# Patient Record
Sex: FEMALE | Race: WHITE | Employment: UNEMPLOYED | URBAN - METROPOLITAN AREA
[De-identification: names, ages, dates, MRNs, and addresses within clinical notes are randomized per-mention and may not be internally consistent; named-entity substitution may affect disease eponyms.]

---

## 2019-02-11 ENCOUNTER — OFFICE VISIT (OUTPATIENT)
Dept: OBGYN CLINIC | Age: 49
End: 2019-02-11

## 2019-02-11 VITALS
SYSTOLIC BLOOD PRESSURE: 149 MMHG | RESPIRATION RATE: 19 BRPM | HEART RATE: 92 BPM | BODY MASS INDEX: 26.03 KG/M2 | WEIGHT: 162 LBS | DIASTOLIC BLOOD PRESSURE: 79 MMHG | HEIGHT: 66 IN

## 2019-02-11 DIAGNOSIS — Z12.31 VISIT FOR SCREENING MAMMOGRAM: ICD-10-CM

## 2019-02-11 DIAGNOSIS — Z11.51 SPECIAL SCREENING EXAMINATION FOR HUMAN PAPILLOMAVIRUS (HPV): ICD-10-CM

## 2019-02-11 DIAGNOSIS — Z01.419 WELL FEMALE EXAM WITH ROUTINE GYNECOLOGICAL EXAM: Primary | ICD-10-CM

## 2019-02-11 NOTE — PROGRESS NOTES
Dheeraj Sylvester is a [address],  57 y.o. female [facility] whose LMP was on 1/25/2019 who presents for her annual checkup. She is having no significant problems. Menstrual status:    Her periods are moderate in flow. She is using three to five pads or tampons per day, and irregular. Occur every 1 to 3 months. They can be heavy. She denies dysmenorrhea. She reports no premenstrual symptoms. The patient is not using HRT. Contraception:    The current method of family planning is none. Sexual history:    She  reports that she currently engages in sexual activity and has had partners who are Male. She reports using the following method of birth control/protection: Condom. Medical conditions:    Since her last annual GYN exam about three or more years ago, she has had the following changes in her health history: none. Pap and Mammogram History:    Her most recent Pap smear was normal obtained year(s) ago. The patient has not had a recent mammogram.    Breast Cancer History/Substance Abuse:    She has no family history of breast cancer. Osteoporosis History:    Family history does not include a first or second degree relative with osteopenia or osteoporosis. A bone density scan has not been previously obtained. Past Medical History:   Diagnosis Date    Abnormal Pap smear     askis in 2001    Heart abnormality     arrythmias with prev twin gestation; now resolved.  Psychiatric problem     anxiety and depression, no treatment for the last 5 years     Past Surgical History:   Procedure Laterality Date    ND ANESTH,DX ARTHROSCOPIC PROC KNEE JOINT      left knee 1992     Current Outpatient Medications   Medication Sig Dispense Refill    ibuprofen (MOTRIN) 600 mg tablet Take 1 Tab by mouth every six (6) hours as needed for Pain. 30 Tab 1    FERROUS FUMARATE (IRON PO) Take  by mouth.       AMBULATORY BREAST PUMP As directed 1 Device 0    PRENAPLUS 27-1 mg tab Allergies: Erythromycin; Codeine; Pcn [penicillins]; and Phenergan [promethazine]   Social History     Socioeconomic History    Marital status:      Spouse name: Marcelo Cunha Number of children: 7    Years of education: Not on file    Highest education level: Not on file   Social Needs    Financial resource strain: Not on file    Food insecurity - worry: Not on file    Food insecurity - inability: Not on file   [employer] needs - medical: Not on file   [employer] needs - non-medical: Not on file   Occupational History    Not on file   Tobacco Use    Smoking status: Current Every Day Smoker     Packs/day: 0.50     Years: 25.00     Pack years: 12.50    Smokeless tobacco: Never Used   Substance and Sexual Activity    Alcohol use: No    Drug use: No    Sexual activity: Yes     Partners: Male     Birth control/protection: Condom   Other Topics Concern    Not on file   Social History Narrative    Not on file     Tobacco History:  reports that she has been smoking. She has a 12.50 pack-year smoking history. she has never used smokeless tobacco.  Alcohol Abuse:  reports that she does not drink alcohol. Drug Abuse:  reports that she does not use drugs.   Patient Active Problem List   Diagnosis Code   (none) - all problems resolved or deleted         Review of Systems - History obtained from the patient  Constitutional: negative for weight loss, fever, night sweats  HEENT: negative for hearing loss, earache, congestion, snoring, sorethroat  CV: negative for chest pain, palpitations, edema  Resp: negative for cough, shortness of breath, wheezing  GI: negative for change in bowel habits, abdominal pain, black or bloody stools  : negative for frequency, dysuria, hematuria, vaginal discharge  MSK: negative for back pain, joint pain, muscle pain  Breast: negative for breast lumps, nipple discharge, galactorrhea  Skin :negative for itching, rash, hives  Neuro: negative for dizziness, headache, confusion, weakness  Psych: negative for anxiety, depression, change in mood  Heme/lymph: negative for bleeding, bruising, pallor    Physical Exam    Visit Vitals  /79 (BP 1 Location: Left arm, BP Patient Position: Sitting)   Pulse 92   Resp 19   Ht 5' 6\" (1.676 m)   Wt 162 lb (73.5 kg)   LMP 01/25/2019   BMI 26.15 kg/m²     Constitutional  · Appearance: well-nourished, well developed, alert, in no acute distress    HENT  · Head and Face: appears normal    Neck  · Inspection/Palpation: normal appearance, no masses or tenderness  Lymph Nodes: no lymphadenopathy present    Chest  · Respiratory Effort: breathing normal    Breasts  · Inspection of Breasts: breasts symmetrical, no skin changes, no discharge present, nipple appearance normal, no skin retraction present  · Palpation of Breasts and Axillae: no masses present on palpation, no breast tenderness  · Axillary Lymph Nodes: no lymphadenopathy present    Gastrointestinal  · Abdominal Examination: abdomen non-tender to palpation, normal bowel sounds, no masses present  · Liver and spleen: no hepatomegaly present, spleen not palpable  · Hernias: no hernias identified    Skin  · General Inspection: no rash, no lesions identified    Neurologic/Psychiatric  · Mental Status:  · Orientation: grossly oriented to person, place and time  · Mood and Affect: mood normal, affect appropriate    Genitourinary  · External Genitalia: normal appearance for age, no discharge present, no tenderness present, no inflammatory lesions present, no masses present, no atrophy present  · Vagina: normal vaginal vault without central or paravaginal defects, no discharge present, no inflammatory lesions present, no masses present  · Bladder: non-tender to palpation  · Urethra: appears normal  · Cervix: normal   · Uterus: normal size, shape and consistency  · Adnexa: no adnexal tenderness present, no adnexal masses present  · Perineum: perineum within normal limits, no evidence of trauma, no rashes or skin lesions present  · Anus: anus within normal limits, no hemorrhoids present  · Inguinal Lymph Nodes: no lymphadenopathy present    Assessment:  Routine gynecologic examination  Her current medical status is satisfactory with no evidence of significant gynecologic issues. Plan:  Counseled re: diet, exercise, healthy lifestyle  Return for yearly wellness visits  Rec annual mammogram  Patient Verbalized understanding  Oferred Mirena or ablation for her bleeding. She smokes and can't take OCPs.

## 2019-02-13 LAB
CYTOLOGIST CVX/VAG CYTO: NORMAL
CYTOLOGY CVX/VAG DOC THIN PREP: NORMAL
CYTOLOGY HISTORY:: NORMAL
DX ICD CODE: NORMAL
HPV I/H RISK 1 DNA CVX QL PROBE+SIG AMP: NEGATIVE
Lab: NORMAL
OTHER STN SPEC: NORMAL
PATH REPORT.FINAL DX SPEC: NORMAL
STAT OF ADQ CVX/VAG CYTO-IMP: NORMAL

## 2019-02-18 ENCOUNTER — HOSPITAL ENCOUNTER (OUTPATIENT)
Dept: MAMMOGRAPHY | Age: 49
Discharge: HOME | End: 2019-02-18
Attending: OBSTETRICS & GYNECOLOGY
Payer: MEDICAID

## 2019-02-18 DIAGNOSIS — Z12.31 VISIT FOR SCREENING MAMMOGRAM: ICD-10-CM

## 2019-02-18 PROCEDURE — 77067 SCR MAMMO BI INCL CAD: CPT

## 2020-03-11 ENCOUNTER — HOSPITAL ENCOUNTER (EMERGENCY)
Age: 50
Discharge: HOME | End: 2020-03-11
Attending: EMERGENCY MEDICINE
Payer: MEDICAID

## 2020-03-11 VITALS
OXYGEN SATURATION: 98 % | WEIGHT: 161.6 LBS | HEIGHT: 66 IN | SYSTOLIC BLOOD PRESSURE: 114 MMHG | BODY MASS INDEX: 25.97 KG/M2 | TEMPERATURE: 98.2 F | DIASTOLIC BLOOD PRESSURE: 84 MMHG | RESPIRATION RATE: 18 BRPM | HEART RATE: 82 BPM

## 2020-03-11 DIAGNOSIS — M54.50 ACUTE MIDLINE LOW BACK PAIN WITHOUT SCIATICA: Primary | ICD-10-CM

## 2020-03-11 PROCEDURE — 74011250637 HC RX REV CODE- 250/637: Performed by: EMERGENCY MEDICINE

## 2020-03-11 PROCEDURE — 96372 THER/PROPH/DIAG INJ SC/IM: CPT

## 2020-03-11 PROCEDURE — 74011250636 HC RX REV CODE- 250/636: Performed by: EMERGENCY MEDICINE

## 2020-03-11 PROCEDURE — 99283 EMERGENCY DEPT VISIT LOW MDM: CPT

## 2020-03-11 RX ORDER — ACETAMINOPHEN 325 MG/1
650 TABLET ORAL
COMMUNITY
End: 2022-09-06

## 2020-03-11 RX ORDER — CYCLOBENZAPRINE HCL 10 MG
TABLET ORAL
COMMUNITY
End: 2021-03-09

## 2020-03-11 RX ORDER — IBUPROFEN 200 MG
600 TABLET ORAL
COMMUNITY

## 2020-03-11 RX ORDER — DIAZEPAM 5 MG/1
5 TABLET ORAL
Status: COMPLETED | OUTPATIENT
Start: 2020-03-11 | End: 2020-03-11

## 2020-03-11 RX ORDER — DIAZEPAM 5 MG/1
5 TABLET ORAL
Qty: 8 TAB | Refills: 0 | Status: SHIPPED | OUTPATIENT
Start: 2020-03-11 | End: 2021-03-09

## 2020-03-11 RX ORDER — KETOROLAC TROMETHAMINE 30 MG/ML
60 INJECTION, SOLUTION INTRAMUSCULAR; INTRAVENOUS ONCE
Status: COMPLETED | OUTPATIENT
Start: 2020-03-11 | End: 2020-03-11

## 2020-03-11 RX ORDER — KETOROLAC TROMETHAMINE 10 MG/1
10 TABLET, FILM COATED ORAL
Qty: 15 TAB | Refills: 0 | Status: SHIPPED | OUTPATIENT
Start: 2020-03-11 | End: 2021-03-09

## 2020-03-11 RX ADMIN — DIAZEPAM 5 MG: 5 TABLET ORAL at 21:50

## 2020-03-11 RX ADMIN — KETOROLAC TROMETHAMINE 60 MG: 30 INJECTION, SOLUTION INTRAMUSCULAR at 21:50

## 2020-03-12 NOTE — ED PROVIDER NOTES
History anxiety/depression. She presents accompanied by her mother with complaints of low back pain. She states it began while pushing a shopping cart earlier this afternoon. She developed a sharp pain which is persisted. It does not radiate. It is moderate in intensity and worse with any movement. She has tried Tylenol, ibuprofen, and Flexeril without relief. She denies any lower extremity numbness, tingling, or weakness. No bowel or bladder incontinence. No other complaints. Past Medical History:   Diagnosis Date    Abnormal Pap smear     askis in 2001    Heart abnormality     arrythmias with prev twin gestation; now resolved.     Psychiatric problem     anxiety and depression, no treatment for the last 5 years       Past Surgical History:   Procedure Laterality Date    [place] ANESTH,DX ARTHROSCOPIC PROC KNEE JOINT      left knee 1992         Family History:   Problem Relation Age of Onset    Elevated Lipids Father     Headache Father     Heart Disease Father     Hypertension Father     Migraines Father     Cancer Maternal Grandmother     Breast Cancer Maternal Grandmother         52's    Cancer Paternal Grandmother        Social History     Socioeconomic History    Marital status:      Spouse name: Tonya Rice Number of children: 9    Years of education: Not on file    Highest education level: Not on file   Occupational History    Not on file   Social Needs    Financial resource strain: Not on file    Food insecurity     Worry: Not on file     Inability: Not on file   [employer] needs     Medical: Not on file     Non-medical: Not on file   Tobacco Use    Smoking status: Current Every Day Smoker     Packs/day: 0.50     Years: 25.00     Pack years: 12.50    Smokeless tobacco: Never Used   Substance and Sexual Activity    Alcohol use: No    Drug use: No    Sexual activity: Yes     Partners: Male     Birth control/protection: Condom   Lifestyle    Physical activity Days per week: Not on file     Minutes per session: Not on file    Stress: Not on file   Relationships    Social connections     Talks on phone: Not on file     Gets together: Not on file     Attends Gnosticist service: Not on file     Active member of club or organization: Not on file     Attends meetings of clubs or organizations: Not on file     Relationship status: Not on file    Intimate partner violence     Fear of current or ex partner: Not on file     Emotionally abused: Not on file     Physically abused: Not on file     Forced sexual activity: Not on file   Other Topics Concern    Not on file   Social History Narrative    Not on file         ALLERGIES: Erythromycin; Codeine; Pcn [penicillins]; and Phenergan [promethazine]    Review of Systems   All other systems reviewed and are negative. Vitals:    03/11/20 2122   BP: 141/81   Pulse: 77   Resp: 18   SpO2: 98%   Weight: 73.3 kg (161 lb 9.6 oz)   Height: 5' 6\" (1.676 m)            Physical Exam  Vitals signs and nursing note reviewed. Constitutional:       Appearance: She is well-developed. HENT:      Head: Normocephalic and atraumatic. Eyes:      Conjunctiva/sclera: Conjunctivae normal.   Neck:      Trachea: No tracheal deviation. Cardiovascular:      Rate and Rhythm: Normal rate. Pulmonary:      Effort: Pulmonary effort is normal.   Abdominal:      General: There is no distension. Musculoskeletal:      Comments: Mild upper lumbar tenderness. Skin:     General: Skin is dry. Neurological:      Mental Status: She is alert. Comments: Normal lower extremity strength and sensation. She was able to walk from her car to the waiting room and from the waiting room to her stretcher. MDM       Procedures    Progress note: She feels better after the Toradol and Valium. Heather Queen MD    Assessment/plan: Low back pain -consistent with muscle spasm; reassuring appearance/exam with normal vital signs.   No neurological red flags.  Home with Toradol and Valium for pain and muscle relaxation. PCP follow-up.   Daniel Wilkes MD

## 2020-03-12 NOTE — DISCHARGE INSTRUCTIONS
Patient Education        Back Pain: Care Instructions  Your Care Instructions    Back pain has many possible causes. It is often related to problems with muscles and ligaments of the back. It may also be related to problems with the nerves, discs, or bones of the back. Moving, lifting, standing, sitting, or sleeping in an awkward way can strain the back. Sometimes you don't notice the injury until later. Arthritis is another common cause of back pain. Although it may hurt a lot, back pain usually improves on its own within several weeks. Most people recover in 12 weeks or less. Using good home treatment and being careful not to stress your back can help you feel better sooner. Follow-up care is a key part of your treatment and safety. Be sure to make and go to all appointments, and call your doctor if you are having problems. It's also a good idea to know your test results and keep a list of the medicines you take. How can you care for yourself at home? · Sit or lie in positions that are most comfortable and reduce your pain. Try one of these positions when you lie down:  ? Lie on your back with your knees bent and supported by large pillows. ? Lie on the floor with your legs on the seat of a sofa or chair. ? Lie on your side with your knees and hips bent and a pillow between your legs. ? Lie on your stomach if it does not make pain worse. · Do not sit up in bed, and avoid soft couches and twisted positions. Bed rest can help relieve pain at first, but it delays healing. Avoid bed rest after the first day of back pain. · Change positions every 30 minutes. If you must sit for long periods of time, take breaks from sitting. Get up and walk around, or lie in a comfortable position. · Try using a heating pad on a low or medium setting for 15 to 20 minutes every 2 or 3 hours. Try a warm shower in place of one session with the heating pad. · You can also try an ice pack for 10 to 15 minutes every 2 to 3 hours. Put a thin cloth between the ice pack and your skin. · Take pain medicines exactly as directed. ? If the doctor gave you a prescription medicine for pain, take it as prescribed. ? If you are not taking a prescription pain medicine, ask your doctor if you can take an over-the-counter medicine. · Take short walks several times a day. You can start with 5 to 10 minutes, 3 or 4 times a day, and work up to longer walks. Walk on level surfaces and avoid hills and stairs until your back is better. · Return to work and other activities as soon as you can. Continued rest without activity is usually not good for your back. · To prevent future back pain, do exercises to stretch and strengthen your back and stomach. Learn how to use good posture, safe lifting techniques, and proper body mechanics. When should you call for help? Call your doctor now or seek immediate medical care if:    · You have new or worsening numbness in your legs.     · You have new or worsening weakness in your legs. (This could make it hard to stand up.)     · You lose control of your bladder or bowels.    Watch closely for changes in your health, and be sure to contact your doctor if:    · You have a fever, lose weight, or don't feel well.     · You do not get better as expected. Where can you learn more? Go to http://yelitza-nima.info/. Enter H383 in the search box to learn more about \"Back Pain: Care Instructions. \"  Current as of: June 26, 2019  Content Version: 12.2  © 7734-2174 [employer]. Care instructions adapted under license by [employer] (which disclaims liability or warranty for this information). If you have questions about a medical condition or this instruction, always ask your healthcare professional. [place] [address] any warranty or liability for your use of this information.

## 2020-03-12 NOTE — ED TRIAGE NOTES
Pt c/o lower back pain that occurred suddenly today while pushing shopping cart out of "[employer]. Pt ambulatory to [address] with mother; denies any numbness or tingling. Pt has tried ibuprofen 600mg, flexeril, and tylenol without any relief.

## 2020-10-11 ENCOUNTER — HOSPITAL ENCOUNTER (EMERGENCY)
Age: 50
Discharge: HOME | End: 2020-10-11
Attending: EMERGENCY MEDICINE
Payer: MEDICAID

## 2020-10-11 VITALS
HEIGHT: 66 IN | WEIGHT: 168 LBS | DIASTOLIC BLOOD PRESSURE: 77 MMHG | OXYGEN SATURATION: 98 % | RESPIRATION RATE: 20 BRPM | HEART RATE: 100 BPM | SYSTOLIC BLOOD PRESSURE: 137 MMHG | TEMPERATURE: 98.4 F | BODY MASS INDEX: 27 KG/M2

## 2020-10-11 DIAGNOSIS — T78.40XA ALLERGIC REACTION, INITIAL ENCOUNTER: Primary | ICD-10-CM

## 2020-10-11 PROCEDURE — 96374 THER/PROPH/DIAG INJ IV PUSH: CPT

## 2020-10-11 PROCEDURE — 99283 EMERGENCY DEPT VISIT LOW MDM: CPT

## 2020-10-11 PROCEDURE — 74011250636 HC RX REV CODE- 250/636: Performed by: EMERGENCY MEDICINE

## 2020-10-11 PROCEDURE — 96375 TX/PRO/DX INJ NEW DRUG ADDON: CPT

## 2020-10-11 PROCEDURE — 94762 N-INVAS EAR/PLS OXIMTRY CONT: CPT

## 2020-10-11 RX ORDER — PSEUDOEPHEDRINE HCL 30 MG
TABLET ORAL
COMMUNITY
End: 2021-03-09

## 2020-10-11 RX ORDER — PREDNISONE 10 MG/1
TABLET ORAL
Qty: 21 TAB | Refills: 0 | Status: SHIPPED | OUTPATIENT
Start: 2020-10-11 | End: 2021-03-09

## 2020-10-11 RX ORDER — SODIUM CHLORIDE 0.9 % (FLUSH) 0.9 %
5-40 SYRINGE (ML) INJECTION EVERY 8 HOURS
Status: DISCONTINUED | OUTPATIENT
Start: 2020-10-11 | End: 2020-10-12 | Stop reason: HOSPADM

## 2020-10-11 RX ORDER — FAMOTIDINE 20 MG/1
20 TABLET, FILM COATED ORAL 2 TIMES DAILY
Qty: 20 TAB | Refills: 0 | Status: SHIPPED | OUTPATIENT
Start: 2020-10-11 | End: 2020-10-21

## 2020-10-11 RX ORDER — DIPHENHYDRAMINE HCL 25 MG
50 CAPSULE ORAL
Qty: 100 CAP | Refills: 0 | Status: SHIPPED | OUTPATIENT
Start: 2020-10-11 | End: 2020-10-21

## 2020-10-11 RX ORDER — DIPHENHYDRAMINE HYDROCHLORIDE 50 MG/ML
50 INJECTION, SOLUTION INTRAMUSCULAR; INTRAVENOUS
Status: COMPLETED | OUTPATIENT
Start: 2020-10-11 | End: 2020-10-11

## 2020-10-11 RX ORDER — EPINEPHRINE 0.3 MG/.3ML
0.3 INJECTION SUBCUTANEOUS
Qty: 2 SYRINGE | Refills: 0 | Status: SHIPPED | OUTPATIENT
Start: 2020-10-11 | End: 2020-10-11

## 2020-10-11 RX ORDER — FAMOTIDINE 10 MG/ML
20 INJECTION INTRAVENOUS
Status: COMPLETED | OUTPATIENT
Start: 2020-10-11 | End: 2020-10-11

## 2020-10-11 RX ORDER — SODIUM CHLORIDE 0.9 % (FLUSH) 0.9 %
5-40 SYRINGE (ML) INJECTION AS NEEDED
Status: DISCONTINUED | OUTPATIENT
Start: 2020-10-11 | End: 2020-10-12 | Stop reason: HOSPADM

## 2020-10-11 RX ADMIN — FAMOTIDINE 20 MG: 10 INJECTION INTRAVENOUS at 22:11

## 2020-10-11 RX ADMIN — DIPHENHYDRAMINE HYDROCHLORIDE 50 MG: 50 INJECTION, SOLUTION INTRAMUSCULAR; INTRAVENOUS at 22:09

## 2020-10-11 RX ADMIN — METHYLPREDNISOLONE SODIUM SUCCINATE 125 MG: 125 INJECTION, POWDER, FOR SOLUTION INTRAMUSCULAR; INTRAVENOUS at 22:14

## 2020-10-12 ENCOUNTER — HOSPITAL ENCOUNTER (EMERGENCY)
Age: 50
Discharge: HOME | End: 2020-10-12
Attending: EMERGENCY MEDICINE
Payer: MEDICAID

## 2020-10-12 VITALS
OXYGEN SATURATION: 100 % | SYSTOLIC BLOOD PRESSURE: 132 MMHG | RESPIRATION RATE: 19 BRPM | DIASTOLIC BLOOD PRESSURE: 62 MMHG | HEART RATE: 99 BPM | TEMPERATURE: 99 F

## 2020-10-12 DIAGNOSIS — T78.40XD ALLERGIC REACTION, SUBSEQUENT ENCOUNTER: Primary | ICD-10-CM

## 2020-10-12 DIAGNOSIS — L50.9 URTICARIA: ICD-10-CM

## 2020-10-12 DIAGNOSIS — R68.89 THROAT SYMPTOM: ICD-10-CM

## 2020-10-12 PROCEDURE — 96375 TX/PRO/DX INJ NEW DRUG ADDON: CPT

## 2020-10-12 PROCEDURE — 99284 EMERGENCY DEPT VISIT MOD MDM: CPT

## 2020-10-12 PROCEDURE — 74011250636 HC RX REV CODE- 250/636: Performed by: EMERGENCY MEDICINE

## 2020-10-12 PROCEDURE — 96372 THER/PROPH/DIAG INJ SC/IM: CPT

## 2020-10-12 PROCEDURE — 96374 THER/PROPH/DIAG INJ IV PUSH: CPT

## 2020-10-12 RX ORDER — DIPHENHYDRAMINE HYDROCHLORIDE 50 MG/ML
25 INJECTION, SOLUTION INTRAMUSCULAR; INTRAVENOUS
Status: COMPLETED | OUTPATIENT
Start: 2020-10-12 | End: 2020-10-12

## 2020-10-12 RX ORDER — EPINEPHRINE 1 MG/ML
0.3 INJECTION, SOLUTION, CONCENTRATE INTRAVENOUS ONCE
Status: COMPLETED | OUTPATIENT
Start: 2020-10-12 | End: 2020-10-12

## 2020-10-12 RX ORDER — EPINEPHRINE 1 MG/ML
0.3 INJECTION INTRAMUSCULAR; INTRAVENOUS; SUBCUTANEOUS
Status: DISCONTINUED | OUTPATIENT
Start: 2020-10-12 | End: 2020-10-12

## 2020-10-12 RX ORDER — FAMOTIDINE 10 MG/ML
20 INJECTION INTRAVENOUS
Status: COMPLETED | OUTPATIENT
Start: 2020-10-12 | End: 2020-10-12

## 2020-10-12 RX ADMIN — FAMOTIDINE 20 MG: 10 INJECTION, SOLUTION INTRAVENOUS at 14:06

## 2020-10-12 RX ADMIN — EPINEPHRINE 0.3 MG: 1 INJECTION, SOLUTION, CONCENTRATE INTRAVENOUS at 14:06

## 2020-10-12 RX ADMIN — DIPHENHYDRAMINE HYDROCHLORIDE 25 MG: 50 INJECTION, SOLUTION INTRAMUSCULAR; INTRAVENOUS at 14:06

## 2020-10-12 RX ADMIN — SODIUM CHLORIDE 1000 ML: 900 INJECTION, SOLUTION INTRAVENOUS at 14:10

## 2020-10-12 RX ADMIN — METHYLPREDNISOLONE SODIUM SUCCINATE 125 MG: 125 INJECTION, POWDER, FOR SOLUTION INTRAMUSCULAR; INTRAVENOUS at 14:06

## 2020-10-12 NOTE — ED NOTES
Patient discharged from ED by provider. Discharge instructions reviewed with patient and all questions answered. Patient ambulatory from ED in [facility].

## 2020-10-12 NOTE — ED NOTES
Pt states \"I have taken benadryl 50mg, Prednisone and pepcid at 12 noon today its still getting worse. \"

## 2020-10-12 NOTE — ED TRIAGE NOTES
Pt ambulated to the treatment area with a steady gait. Pt states \"yesterday at 330pm I started an allergic reaction with itchy hives all over. I came here with treatment it calmed down I have taken the prescriptions but it started getting worse at 3am and its continueing to spread with hives all over and my throat feels itchy but not swollen. \" Pt appears in no distress at this time.  Spo2 98% RA

## 2020-10-12 NOTE — ED PROVIDER NOTES
This is a 46-year-old female comes emergency room with chief complaint of allergic reaction. Patient states that she took phenylephrine for her sinuses and nasal congestion earlier today. Patient states that she then started to break out in splotchy red rash. Patient states that these areas were quite pruritic in nature. Patient denies any difficulty breathing or swallowing. Patient denies any fever or chills. Patient denies any abdominal pain, chest pain, or shortness of breath. Patient did take Benadryl, 50 mg, earlier which seemed to slightly improve the rash. The history is provided by the patient. No  was used. Allergic Reaction    This is a new problem. The current episode started 3 to 5 hours ago. Pertinent negatives include no vomiting, no suicidal ideas, no confusion and no shortness of breath. Past Medical History:   Diagnosis Date    Abnormal Pap smear     askis in 2001    Heart abnormality     arrythmias with prev twin gestation; now resolved.     Psychiatric problem     anxiety and depression, no treatment for the last 5 years       Past Surgical History:   Procedure Laterality Date    [place] ANESTH,DX ARTHROSCOPIC PROC KNEE JOINT      left knee 1992         Family History:   Problem Relation Age of Onset    Elevated Lipids Father     Headache Father     Heart Disease Father     Hypertension Father     Migraines Father     Cancer Maternal Grandmother     Breast Cancer Maternal Grandmother         52's    Cancer Paternal Grandmother        Social History     Socioeconomic History    Marital status:      Spouse name: Marc Calvin Number of children: 9    Years of education: Not on file    Highest education level: Not on file   Occupational History    Not on file   Social Needs    Financial resource strain: Not on file    Food insecurity     Worry: Not on file     Inability: Not on file    Transportation needs     Medical: Not on file Non-medical: Not on file   Tobacco Use    Smoking status: Current Every Day Smoker     Packs/day: 0.50     Years: 25.00     Pack years: 12.50    Smokeless tobacco: Never Used   Substance and Sexual Activity    Alcohol use: No    Drug use: No    Sexual activity: Yes     Partners: Male     Birth control/protection: Condom   Lifestyle    Physical activity     Days per week: Not on file     Minutes per session: Not on file    Stress: Not on file   Relationships    Social connections     Talks on phone: Not on file     Gets together: Not on file     Attends Cheondo[employer] service: Not on file     Active member of club or organization: Not on file     Attends meetings of clubs or organizations: Not on file     Relationship status: Not on file    Intimate partner violence     Fear of current or ex partner: Not on file     Emotionally abused: Not on file     Physically abused: Not on file     Forced sexual activity: Not on file   Other Topics Concern    Not on file   Social History Narrative    Not on file     ALLERGIES: Erythromycin; Codeine; Pcn [penicillins]; and Phenergan [promethazine]    Review of Systems   Constitutional: Negative for appetite change, chills, fever and unexpected weight change. HENT: Negative for ear pain, hearing loss, rhinorrhea and trouble swallowing. Eyes: Negative for pain and visual disturbance. Respiratory: Negative for cough, chest tightness and shortness of breath. Cardiovascular: Negative for chest pain and palpitations. Gastrointestinal: Negative for abdominal distention, abdominal pain, blood in stool and vomiting. Genitourinary: Negative for dysuria, hematuria and urgency. Musculoskeletal: Negative for back pain and myalgias. Skin: Positive for rash. Neurological: Negative for dizziness, syncope, weakness and numbness. Psychiatric/Behavioral: Negative for confusion and suicidal ideas. All other systems reviewed and are negative.       Vitals:    10/11/20 2151 10/11/20 2200 10/11/20 2218 10/11/20 2230   BP: (!) 145/77 136/79  137/77   Pulse: 100      Resp: 20      Temp: 98.4 °F (36.9 °C)      SpO2: 97% 97% 97% 98%   Weight: 76.2 kg (168 lb)      Height: 5' 6\" (1.676 m)               Physical Exam  Vitals signs and nursing note reviewed. Constitutional:       General: She is not in acute distress. Appearance: Normal appearance. She is well-developed. She is not ill-appearing, toxic-appearing or diaphoretic. HENT:      Head: Normocephalic and atraumatic. Right Ear: External ear normal.      Left Ear: External ear normal.   Eyes:      General: No scleral icterus. Right eye: No discharge. Left eye: No discharge. Conjunctiva/sclera: Conjunctivae normal.      Pupils: Pupils are equal, round, and reactive to light. Neck:      Musculoskeletal: Normal range of motion and neck supple. Vascular: No JVD. Trachea: No tracheal deviation. Cardiovascular:      Rate and Rhythm: Normal rate and regular rhythm. Heart sounds: Normal heart sounds. No murmur. No friction rub. No gallop. Pulmonary:      Effort: Pulmonary effort is normal. No respiratory distress. Breath sounds: Normal breath sounds. No stridor. No decreased breath sounds, wheezing, rhonchi or rales. Chest:      Chest wall: No tenderness. Abdominal:      General: Bowel sounds are normal. There is no distension. Palpations: Abdomen is soft. Tenderness: There is no abdominal tenderness. There is no guarding or rebound. Musculoskeletal: Normal range of motion. General: No tenderness. Skin:     General: Skin is warm and dry. Capillary Refill: Capillary refill takes less than 2 seconds. Coloration: Skin is not pale. Findings: Rash present. No erythema. Rash is urticarial.          Neurological:      General: No focal deficit present. Mental Status: She is alert and oriented to person, place, and time.       GCS: GCS eye subscore is 4. GCS verbal subscore is 5. GCS motor subscore is 6. Cranial Nerves: No cranial nerve deficit. Sensory: No sensory deficit. Motor: No weakness or abnormal muscle tone. Coordination: Coordination normal.      Deep Tendon Reflexes: Reflexes are normal and symmetric. Reflexes normal.   Psychiatric:         Mood and Affect: Mood normal.         Behavior: Behavior normal.         Thought Content: Thought content normal.         Judgment: Judgment normal.          MDM  Number of Diagnoses or Management Options  Allergic reaction, initial encounter:   Risk of Complications, Morbidity, and/or Mortality  Presenting problems: moderate  Diagnostic procedures: low  Management options: moderate    Patient Progress  Patient progress: improved       Procedures    Chief Complaint   Patient presents with    Allergic Reaction       The patient's presenting problems have been discussed, and they are in agreement with the care plan formulated and outlined with them. I have encouraged them to ask questions as they arise throughout their visit. MEDICATIONS GIVEN:  Medications   diphenhydrAMINE (BENADRYL) injection 50 mg (50 mg IntraVENous Given 10/11/20 2209)   methylPREDNISolone (PF) (Solu-MEDROL) injection 125 mg (125 mg IntraVENous Given 10/11/20 2214)   famotidine (PF) (PEPCID) injection 20 mg (20 mg IntraVENous Given 10/11/20 2211)       LABS REVIEWED:  No results found for this or any previous visit (from the past 24 hour(s)). VITAL SIGNS:  Patient Vitals for the past 24 hrs:   Temp Pulse Resp BP SpO2   10/11/20 2230 -- -- -- 137/77 98 %   10/11/20 2218 -- -- -- -- 97 %   10/11/20 2200 -- -- -- 136/79 97 %   10/11/20 2151 98.4 °F (36.9 °C) 100 20 (!) 145/77 97 %       RADIOLOGY RESULTS:  The following have been ordered and reviewed:  No results found. PROGRESS NOTES:  Patient symptoms have improved. Discussed results and plan with patient. Patient will be discharged home with PCP follow up.  Patient instructed to return to the emergency room for any worsening symptoms or any other concerns. DIAGNOSIS:    1. Allergic reaction, initial encounter        PLAN:  Follow-up Information     Follow up With Specialties Details Why Contact Info    your doctor  Schedule an appointment as soon as possible for a visit      [facility] EMERGENCY DEPT Emergency Medicine  If symptoms worsen [place] [id number]  [phone]        Discharge Medication List as of 10/11/2020 10:37 PM      START taking these medications    Details   predniSONE (STERAPRED DS) 10 mg dose pack Take as directed. , Print, Disp-21 Tab,R-0      diphenhydrAMINE (BenadryL) 25 mg capsule Take 2 Caps by mouth every six (6) hours as needed for Allergies for up to 10 days. , Print, Disp-100 Cap,R-0      famotidine (Pepcid) 20 mg tablet Take 1 Tab by mouth two (2) times a day for 10 days. , Print, Disp-20 Tab,R-0      EPINEPHrine (EPIPEN) 0.3 mg/0.3 mL injection 0.3 mL by IntraMUSCular route once as needed for Allergic Response for up to 1 dose., Print, Disp-2 Syringe,R-0         CONTINUE these medications which have NOT CHANGED    Details   pdpllzokf-gv-nyvzxson-guaifen 5--100 mg tab Take 10 mg by mouth., Historical Med      ibuprofen (MOTRIN) 200 mg tablet Take 600 mg by mouth., Historical Med      pseudoephedrine (Sudafed) 30 mg tablet Take  by mouth every four (4) hours as needed for Congestion. , Historical Med      Phenylephrine-DM-Acetaminophen 5- mg/15 mL liqd Take  by mouth., Historical Med      acetaminophen (TylenoL) 325 mg tablet Take 650 mg by mouth every four (4) hours as needed for Pain., Historical Med      cyclobenzaprine (FLEXERIL) 10 mg tablet Take  by mouth three (3) times daily as needed for Muscle Spasm(s). , Historical Med      ketorolac (TORADOL) 10 mg tablet Take 1 Tab by mouth every six (6) hours as needed for Pain., Print, Disp-15 Tab, R-0      diazePAM (Valium) 5 mg tablet Take 1 Tab by mouth three (3) times daily as needed (spasm). Max Daily Amount: 15 mg., Print, Disp-8 Tab, R-0             ED COURSE: The patient's hospital course has been uncomplicated. Please note that this dictation was completed with [employer], the computer voice recognition software. Quite often unanticipated grammatical, syntax, homophones, and other interpretive errors are inadvertently transcribed by the computer software. Please disregard these errors. Please excuse any errors that have escaped final proofreading.

## 2020-10-12 NOTE — DISCHARGE INSTRUCTIONS
Patient Education        Allergic Reaction: Care Instructions  Your Care Instructions     An allergic reaction is an excessive response from your immune system to a medicine, chemical, food, insect bite, or other substance. A reaction can range from mild to life-threatening. Some people have a mild rash, hives, and itching or stomach cramps. In severe reactions, swelling of your tongue and throat can close up your airway so that you cannot breathe. Follow-up care is a key part of your treatment and safety. Be sure to make and go to all appointments, and call your doctor if you are having problems. It's also a good idea to know your test results and keep a list of the medicines you take. How can you care for yourself at home? · If you know what caused your allergic reaction, be sure to avoid it. Your allergy may become more severe each time you have a reaction. · Take an over-the-counter antihistamine, such as cetirizine (Zyrtec) or loratadine (Claritin), to treat mild symptoms. Read and follow directions on the label. Some antihistamines can make you feel sleepy. Do not give antihistamines to a child unless you have checked with your doctor first. Mild symptoms include sneezing or an itchy or runny nose; an itchy mouth; a few hives or mild itching; and mild nausea or stomach discomfort. · Do not scratch hives or a rash. Put a cold, moist towel on them or take cool baths to relieve itching. Put ice packs on hives, swelling, or insect stings for 10 to 15 minutes at a time. Put a thin cloth between the ice pack and your skin. Do not take hot baths or showers. They will make the itching worse. · Your doctor may prescribe a shot of epinephrine to carry with you in case you have a severe reaction. Learn how to give yourself the shot and keep it with you at all times. Make sure it is not .   · Go to the emergency room every time you have a severe reaction, even if you have used your shot of epinephrine and are feeling better. Symptoms can come back after a shot. · Wear medical alert jewelry that lists your allergies. You can buy this at most drugstores. · If your child has a severe allergy, make sure that his or her teachers, babysitters, coaches, and other caregivers know about the allergy. They should have an epinephrine shot, know how and when to give it, and have a plan to take your child to the hospital.  When should you call for help? Give an epinephrine shot if:    · You think you are having a severe allergic reaction.     · You have symptoms in more than one body area, such as mild nausea and an itchy mouth. After giving an epinephrine shot call 911, even if you feel better. Call 911 if:    · You have symptoms of a severe allergic reaction. These may include:  ? Sudden raised, red areas (hives) all over your body. ? Swelling of the throat, mouth, lips, or tongue. ? Trouble breathing. ? Passing out (losing consciousness). Or you may feel very lightheaded or suddenly feel weak, confused, or restless.     · You have been given an epinephrine shot, even if you feel better. Call your doctor now or seek immediate medical care if:    · You have symptoms of an allergic reaction, such as:  ? A rash or hives (raised, red areas on the skin). ? Itching. ? Swelling. ? Belly pain, nausea, or vomiting. Watch closely for changes in your health, and be sure to contact your doctor if:    · You do not get better as expected. Where can you learn more? Go to http://www.gray.com/  Enter C568 in the search box to learn more about \"Allergic Reaction: Care Instructions. \"  Current as of: June 29, 2020               Content Version: 12.6  © 0149-4049 [employer], Incorporated. Care instructions adapted under license by [employer] (which disclaims liability or warranty for this information).  If you have questions about a medical condition or this instruction, always ask your healthcare professional. [place] [address] any warranty or liability for your use of this information. Patient Education        Hives: Care Instructions  Your Care Instructions  Hives are raised, red, itchy patches of skin. They are also called wheals or welts. They usually have red borders and pale centers. Hives range in size from ¼ inch to 3 inches or more across. They may seem to move from place to place on the skin. Several hives may form a large area of raised, red skin. You can get hives after an insect sting, after taking medicine or eating certain foods, or because of infection or stress. Other causes include plants, things you breathe in, makeup, heat, cold, sunlight, and latex. You cannot spread hives to other people. Hives may last a few minutes or a few days, but a single spot may last less than 36 hours. Follow-up care is a key part of your treatment and safety. Be sure to make and go to all appointments, and call your doctor if you are having problems. It's also a good idea to know your test results and keep a list of the medicines you take. How can you care for yourself at home? · Avoid whatever you think may have caused your hives, such as a certain food or medicine. However, you may not know the cause. · Put a cool, wet towel on the area to relieve itching. · Take an over-the-counter antihistamine, such as diphenhydramine (Benadryl), cetirizine (Zyrtec), or loratadine (Claritin), to help stop the hives and calm the itching. Read and follow directions on the label. These medicines can make you feel sleepy. Do not drive while using them. · Stay away from strong soaps, detergents, and chemicals. These can make itching worse. When should you call for help? Call 911 anytime you think you may need emergency care. For example, call if:    · You have symptoms of a severe allergic reaction.  These may include:  ? Sudden raised, red areas (hives) all over your body.  ? Swelling of the throat, mouth, lips, or tongue. ? Trouble breathing. ? Passing out (losing consciousness). Or you may feel very lightheaded or suddenly feel weak, confused, or restless. Call your doctor now or seek immediate medical care if:    · You have symptoms of an allergic reaction, such as:  ? A rash or hives (raised, red areas on the skin). ? Itching. ? Swelling. ? Belly pain, nausea, or vomiting.     · You get hives after you start a new medicine.     · Hives have not gone away after 24 hours. Watch closely for changes in your health, and be sure to contact your doctor if:    · You do not get better as expected. Where can you learn more? Go to http://www.gray.com/  Enter [id number] in the search box to learn more about \"Hives: Care Instructions. \"  Current as of: June 26, 2019               Content Version: 12.6  © 4998-7616 [employer]. Care instructions adapted under license by [employer] (which disclaims liability or warranty for this information). If you have questions about a medical condition or this instruction, always ask your healthcare professional. [address] any warranty or liability for your use of this information.

## 2020-10-12 NOTE — ED NOTES
Patient states she drove herself to ED; but is able to get a ride from her mother who lives near by.

## 2020-10-12 NOTE — ED TRIAGE NOTES
Patient arrives ambulatory to [facility] with c/o itchy rash and states \"I think I'm having an allergic reaction.

## 2020-10-12 NOTE — ED NOTES
IV fluids completed at this time.  Patient ambulatory to restroom with a steady gait as per request.

## 2020-10-12 NOTE — DISCHARGE INSTRUCTIONS
We hope that we have addressed all of your medical concerns. The examination and treatment you received in the Emergency Department were for an emergent problem and were not intended as complete care. It is important that you follow up with your healthcare provider(s) for ongoing care. If your symptoms worsen or do not improve as expected, and you are unable to reach your usual health care provider(s), you should return to the Emergency Department. Today's healthcare is undergoing tremendous change, and patient satisfaction surveys are one of the many tools to assess the quality of medical care. You may receive a survey from the [employer] organization regarding your experience in the Emergency Department. I hope that your experience has been completely positive, particularly the medical care that I provided. As such, please participate in the survey; anything less than excellent does not meet my expectations or intentions. [facility] and [facility] participate in nationally recognized quality of care measures. If your blood pressure is greater than 120/80, as reported below, we urge that you seek medical care to address the potential of high blood pressure, commonly known as hypertension. Hypertension can be hereditary or can be caused by certain medical conditions, pain, stress, or \"white coat syndrome. \"       Please make an appointment with your health care provider(s) for follow up of your Emergency Department visit. VITALS:   Patient Vitals for the past 8 hrs:   Temp Pulse Resp BP SpO2   10/11/20 2218 -- -- -- -- 97 %   10/11/20 2200 -- -- -- 136/79 97 %   10/11/20 2151 98.4 °F (36.9 °C) 100 20 (!) 145/77 97 %          Thank you for allowing us to provide you with medical care today. We realize that you have many choices for your emergency care needs. Please choose us in the future for any continued health care needs.       Regards, Theodore Garrison [address] [facility] [place]: [phone]            No results found for this or any previous visit (from the past 24 hour(s)). No results found. Patient Education        Allergic Reaction: Care Instructions  Your Care Instructions     An allergic reaction is an excessive response from your immune system to a medicine, chemical, food, insect bite, or other substance. A reaction can range from mild to life-threatening. Some people have a mild rash, hives, and itching or stomach cramps. In severe reactions, swelling of your tongue and throat can close up your airway so that you cannot breathe. Follow-up care is a key part of your treatment and safety. Be sure to make and go to all appointments, and call your doctor if you are having problems. It's also a good idea to know your test results and keep a list of the medicines you take. How can you care for yourself at home? · If you know what caused your allergic reaction, be sure to avoid it. Your allergy may become more severe each time you have a reaction. · Take an over-the-counter antihistamine, such as cetirizine (Zyrtec) or loratadine (Claritin), to treat mild symptoms. Read and follow directions on the label. Some antihistamines can make you feel sleepy. Do not give antihistamines to a child unless you have checked with your doctor first. Mild symptoms include sneezing or an itchy or runny nose; an itchy mouth; a few hives or mild itching; and mild nausea or stomach discomfort. · Do not scratch hives or a rash. Put a cold, moist towel on them or take cool baths to relieve itching. Put ice packs on hives, swelling, or insect stings for 10 to 15 minutes at a time. Put a thin cloth between the ice pack and your skin. Do not take hot baths or showers. They will make the itching worse. · Your doctor may prescribe a shot of epinephrine to carry with you in case you have a severe reaction.  Learn how to give yourself the shot and keep it with you at all times. Make sure it is not . · Go to the emergency room every time you have a severe reaction, even if you have used your shot of epinephrine and are feeling better. Symptoms can come back after a shot. · Wear medical alert jewelry that lists your allergies. You can buy this at most drugstores. · If your child has a severe allergy, make sure that his or her teachers, babysitters, coaches, and other caregivers know about the allergy. They should have an epinephrine shot, know how and when to give it, and have a plan to take your child to the hospital.  When should you call for help? Give an epinephrine shot if:    · You think you are having a severe allergic reaction.     · You have symptoms in more than one body area, such as mild nausea and an itchy mouth. After giving an epinephrine shot call 911, even if you feel better. Call 911 if:    · You have symptoms of a severe allergic reaction. These may include:  ? Sudden raised, red areas (hives) all over your body. ? Swelling of the throat, mouth, lips, or tongue. ? Trouble breathing. ? Passing out (losing consciousness). Or you may feel very lightheaded or suddenly feel weak, confused, or restless.     · You have been given an epinephrine shot, even if you feel better. Call your doctor now or seek immediate medical care if:    · You have symptoms of an allergic reaction, such as:  ? A rash or hives (raised, red areas on the skin). ? Itching. ? Swelling. ? Belly pain, nausea, or vomiting. Watch closely for changes in your health, and be sure to contact your doctor if:    · You do not get better as expected. Where can you learn more? Go to http://www.gray.com/  Enter I190 in the search box to learn more about \"Allergic Reaction: Care Instructions. \"  Current as of: 2020               Content Version: 12.6  © 3537-5721 [employer].    Care instructions adapted under license by [employer] (which disclaims liability or warranty for this information). If you have questions about a medical condition or this instruction, always ask your healthcare professional. [address] any warranty or liability for your use of this information.

## 2020-10-12 NOTE — ED PROVIDER NOTES
53-year-old female with history of anxiety and depression and frequent PVCs is here for second visit for an allergic reaction that started yesterday with hives and generalized itching. She thought it was related to generic Sudafed, but she had another worsening of her condition around 3:00 in the morning and she had not taken any additional Sudafed. She had been drinking [employer] [facility] and wonders if this could be the cause of her symptoms. No nausea or vomiting. No diarrhea. No abdominal pain. No trouble breathing, but she has a rash over her entire trunk and groin and breast.  Was quite itchy and has gotten worse even though she just took Benadryl, Pepcid, and her steroids around noon. She is now feeling an itch in her throat and it feels uncomfortable. Her voice sounds little hoarse to her, but she is able to swallow and breathe normally. Past Medical History:   Diagnosis Date    Abnormal Pap smear     askis in 2001    Heart abnormality     arrythmias with prev twin gestation; now resolved.     Psychiatric problem     anxiety and depression, no treatment for the last 5 years       Past Surgical History:   Procedure Laterality Date    [place] ANESTH,DX ARTHROSCOPIC PROC KNEE JOINT      left knee 1992         Family History:   Problem Relation Age of Onset    Elevated Lipids Father     Headache Father     Heart Disease Father     Hypertension Father     Migraines Father     Cancer Maternal Grandmother     Breast Cancer Maternal Grandmother         52's    Cancer Paternal Grandmother        Social History     Socioeconomic History    Marital status:      Spouse name: Tee Voss Number of children: 9    Years of education: Not on file    Highest education level: Not on file   Occupational History    Not on file   Social Needs    Financial resource strain: Not on file    Food insecurity     Worry: Not on file     Inability: Not on file    Transportation needs     Medical: Not on file Non-medical: Not on file   Tobacco Use    Smoking status: Current Every Day Smoker     Packs/day: 0.50     Years: 25.00     Pack years: 12.50    Smokeless tobacco: Never Used   Substance and Sexual Activity    Alcohol use: No    Drug use: No    Sexual activity: Yes     Partners: Male     Birth control/protection: Condom   Lifestyle    Physical activity     Days per week: Not on file     Minutes per session: Not on file    Stress: Not on file   Relationships    Social connections     Talks on phone: Not on file     Gets together: Not on file     Attends [employer] service: Not on file     Active member of club or organization: Not on file     Attends meetings of clubs or organizations: Not on file     Relationship status: Not on file    Intimate partner violence     Fear of current or ex partner: Not on file     Emotionally abused: Not on file     Physically abused: Not on file     Forced sexual activity: Not on file   Other Topics Concern    Not on file   Social History Narrative    Not on file         ALLERGIES: Erythromycin; Codeine; Pcn [penicillins]; and Phenergan [promethazine]    Review of Systems   Constitutional: Negative for fever. HENT: Negative for trouble swallowing. Eyes: Negative for visual disturbance. Respiratory: Negative for cough. Cardiovascular: Negative for chest pain. Gastrointestinal: Negative for abdominal pain. Genitourinary: Negative for difficulty urinating. Musculoskeletal: Negative for gait problem. Skin: Positive for rash. Neurological: Negative for headaches. Hematological: Does not bruise/bleed easily. Psychiatric/Behavioral: Negative for sleep disturbance. Vitals:    10/12/20 1330   BP: 128/82   Pulse: (!) 104   Resp: 18   Temp: 99 °F (37.2 °C)   SpO2: 96%            Physical Exam  Constitutional:       Appearance: Normal appearance. HENT:      Head: Normocephalic.       Nose: Nose normal.      Mouth/Throat:      Mouth: Mucous membranes are moist.   Eyes:      Extraocular Movements: Extraocular movements intact. Conjunctiva/sclera: Conjunctivae normal.   Cardiovascular:      Rate and Rhythm: Normal rate. Pulmonary:      Effort: Pulmonary effort is normal. No respiratory distress. Abdominal:      General: Abdomen is flat. Musculoskeletal: Normal range of motion. Skin:     General: Skin is warm and dry. Capillary Refill: Capillary refill takes less than 2 seconds. Findings: Rash present. Rash is urticarial.   Neurological:      General: No focal deficit present. Mental Status: She is alert. Psychiatric:         Behavior: Behavior normal.          MDM  Number of Diagnoses or Management Options  Allergic reaction, subsequent encounter:   Throat symptom:   Urticaria:   Diagnosis management comments: Worsening of her rash along with an uncomfortable itching in her throat is enough of an indication for me to give epinephrine. She knows this could make her feel a little strange and she may have some tachycardia, but it should help resolve her symptoms. She does not have anaphylaxis and she is not having any true respiratory compromise at the moment, but this could be an early precursor to it. Plan to watch her closely for any deterioration and if her condition improves greatly then she may be discharged home.     Update 3 PM  Throat feels much much better  Rashes roughly the same, possibly starting to improve  Patient would like to go home and follow-up with her doctor and see an allergist  She is to continue her antihistamines and steroids and use her EpiPen with any airway issues  Return to ED if symptoms worsen         Procedures

## 2020-10-12 NOTE — ED NOTES
Patient feeling better; ride is here . I have reviewed discharge instructions with the patient. The patient verbalized understanding.

## 2021-03-09 ENCOUNTER — OFFICE VISIT (OUTPATIENT)
Dept: FAMILY MEDICINE CLINIC | Age: 51
End: 2021-03-09
Payer: MEDICAID

## 2021-03-09 ENCOUNTER — HOSPITAL ENCOUNTER (OUTPATIENT)
Dept: GENERAL RADIOLOGY | Age: 51
Discharge: HOME | End: 2021-03-09
Attending: INTERNAL MEDICINE
Payer: MEDICAID

## 2021-03-09 VITALS
SYSTOLIC BLOOD PRESSURE: 132 MMHG | HEART RATE: 91 BPM | WEIGHT: 173 LBS | BODY MASS INDEX: 27.8 KG/M2 | RESPIRATION RATE: 18 BRPM | DIASTOLIC BLOOD PRESSURE: 86 MMHG | HEIGHT: 66 IN | OXYGEN SATURATION: 98 % | TEMPERATURE: 97.2 F

## 2021-03-09 DIAGNOSIS — G89.29 CHRONIC BILATERAL LOW BACK PAIN WITH LEFT-SIDED SCIATICA: ICD-10-CM

## 2021-03-09 DIAGNOSIS — Z12.31 ENCOUNTER FOR SCREENING MAMMOGRAM FOR MALIGNANT NEOPLASM OF BREAST: ICD-10-CM

## 2021-03-09 DIAGNOSIS — M25.551 BILATERAL HIP PAIN: ICD-10-CM

## 2021-03-09 DIAGNOSIS — M62.838 MUSCLE SPASM: ICD-10-CM

## 2021-03-09 DIAGNOSIS — M25.552 BILATERAL HIP PAIN: ICD-10-CM

## 2021-03-09 DIAGNOSIS — E55.9 VITAMIN D DEFICIENCY: ICD-10-CM

## 2021-03-09 DIAGNOSIS — Z12.31 SCREENING MAMMOGRAM, ENCOUNTER FOR: ICD-10-CM

## 2021-03-09 DIAGNOSIS — Z11.59 ENCOUNTER FOR HEPATITIS C SCREENING TEST FOR LOW RISK PATIENT: ICD-10-CM

## 2021-03-09 DIAGNOSIS — F17.200 TOBACCO DEPENDENCE: ICD-10-CM

## 2021-03-09 DIAGNOSIS — M54.42 CHRONIC BILATERAL LOW BACK PAIN WITH LEFT-SIDED SCIATICA: ICD-10-CM

## 2021-03-09 DIAGNOSIS — Z00.00 WELL WOMAN EXAM (NO GYNECOLOGICAL EXAM): Primary | ICD-10-CM

## 2021-03-09 DIAGNOSIS — E03.9 ACQUIRED HYPOTHYROIDISM: ICD-10-CM

## 2021-03-09 DIAGNOSIS — E78.2 MIXED HYPERLIPIDEMIA: ICD-10-CM

## 2021-03-09 PROCEDURE — 99386 PREV VISIT NEW AGE 40-64: CPT | Performed by: INTERNAL MEDICINE

## 2021-03-09 PROCEDURE — 72100 X-RAY EXAM L-S SPINE 2/3 VWS: CPT

## 2021-03-09 PROCEDURE — 73521 X-RAY EXAM HIPS BI 2 VIEWS: CPT

## 2021-03-09 RX ORDER — DIPHENHYDRAMINE HCL 25 MG
50 CAPSULE ORAL
COMMUNITY
End: 2022-09-06

## 2021-03-09 RX ORDER — TIZANIDINE 2 MG/1
TABLET ORAL
Qty: 30 TAB | Refills: 0 | Status: SHIPPED | OUTPATIENT
Start: 2021-03-09 | End: 2022-09-06

## 2021-03-09 RX ORDER — VARENICLINE TARTRATE 25 MG
KIT ORAL
Qty: 1 DOSE PACK | Refills: 0 | Status: SHIPPED | OUTPATIENT
Start: 2021-03-09 | End: 2021-06-17

## 2021-03-09 NOTE — PROGRESS NOTES
Identified pt with two pt identifiers(name and ). Chief Complaint   Patient presents with    New Patient     est care    Hip Pain     both- started in 2020    Complete Physical    Labs        Health Maintenance Due   Topic    Hepatitis C Screening     Pneumococcal 0-64 years (1 of 1 - PPSV23)    COVID-19 Vaccine (1 of 2)    Lipid Screen     Flu Vaccine (1)       Wt Readings from Last 3 Encounters:   21 173 lb (78.5 kg)   10/11/20 168 lb (76.2 kg)   20 161 lb 9.6 oz (73.3 kg)     Temp Readings from Last 3 Encounters:   21 97.2 °F (36.2 °C) (Temporal)   10/12/20 99 °F (37.2 °C)   10/11/20 98.4 °F (36.9 °C)     BP Readings from Last 3 Encounters:   21 132/86   10/12/20 132/62   10/11/20 137/77     Pulse Readings from Last 3 Encounters:   21 91   10/12/20 99   10/11/20 100         Learning Assessment:  :     Learning Assessment 2016   PRIMARY LEARNER Patient Patient   HIGHEST LEVEL OF EDUCATION - PRIMARY LEARNER  - 2 YEARS OF COLLEGE   BARRIERS PRIMARY LEARNER - NONE   CO-LEARNER CAREGIVER - No   PRIMARY LANGUAGE ENGLISH ENGLISH   LEARNER PREFERENCE PRIMARY DEMONSTRATION DEMONSTRATION   ANSWERED BY patient patient    RELATIONSHIP SELF SELF       Depression Screening:  :     3 most recent PHQ Screens 3/9/2021   Little interest or pleasure in doing things Not at all   Feeling down, depressed, irritable, or hopeless Not at all   Total Score PHQ 2 0       Fall Risk Assessment:  :     Fall Risk Assessment, last 12 mths 3/9/2021   Able to walk? Yes   Fall in past 12 months? 0   Do you feel unsteady? 0   Are you worried about falling 0       Abuse Screening:  :     Abuse Screening Questionnaire 3/9/2021   Do you ever feel afraid of your partner? N   Are you in a relationship with someone who physically or mentally threatens you? N   Is it safe for you to go home?  Y       Coordination of Care Questionnaire:  :     1) Have you been to an emergency room, urgent care clinic since your last visit? yes 3/1/20, 10/11/20, 10/12/20 [facility]  Hospitalized since your last visit? no             2) Have you seen or consulted any other health care providers outside of [address] [place] [address] since your last visit? yes  [facility] 10/2020- allergy testing    3) Do you have an Advance Directive on file? no  Are you interested in receiving information about Advance Directives? no    Reviewed record in preparation for visit and have obtained necessary documentation.

## 2021-03-09 NOTE — PROGRESS NOTES
CHIEF COMPLAINT:   Chief Complaint   Patient presents with    New Patient     est care    Hip Pain     both- started in October 2020    Complete Physical    Labs         HISTORY OF PRESENT ILLNESS:   Very pleasant 50F who [facility].. She has been generally healthy. She has her pap's and gynecological care done with [facility] [place]. Her only concern is isues with hip pain. L>>R. Started in October 2020. She denies any injuries. It is a constant pain. When it is really bad, she has a hard time putting weight on one side or another. It has never been evaluated by anyone. Today, the pain is about a 4/10. Lastly, she is interested in smoking cessation. She has been using nicorette gum, but would be happy to try Chantix. PAST MEDICAL HISTORY:  Past Medical History:   Diagnosis Date    Abnormal Pap smear     askis in 2001    Heart abnormality     arrythmias with prev twin gestation; now resolved.  Psychiatric problem     anxiety and depression, no treatment for the last 5 years         PAST SURGICAL HISTORY:    Past Surgical History:   Procedure Laterality Date    HX TUBAL LIGATION  2016    [place] ANESTH,DX ARTHROSCOPIC PROC KNEE JOINT      left knee 1992       MEDICATIONS:  Current Outpatient Medications   Medication Sig Dispense Refill    diphenhydrAMINE (BenadryL) 25 mg capsule Take 50 mg by mouth every six (6) hours as needed.  ibuprofen (MOTRIN) 200 mg tablet Take 600 mg by mouth every six (6) hours as needed.  acetaminophen (TylenoL) 325 mg tablet Take 650 mg by mouth every six (6) hours as needed for Pain. ALLERGIES:  Allergies   Allergen Reactions    Erythromycin Shortness of Breath    Codeine Hives and Nausea and Vomiting    Pcn [Penicillins] Hives    Phenergan [Promethazine] Hives     Saw an allergist and states she is no longer allergic to PCN. [facility]  We will need to get the documentation from the allergist.      SOCIAL HISTORY:   Social History     Tobacco Use    Smoking status: Current Every Day Smoker     Packs/day: 0.50     Years: 25.00     Pack years: 12.50     Types: Cigarettes    Smokeless tobacco: Never Used   Substance Use Topics    Alcohol use: No    Drug use: No     Ready to quit: Yes  Counseling given: Yes  Comment: Patient is willing to try chantix      FAMILY HISTORY:   Family History   Problem Relation Age of Onset    Elevated Lipids Father     Headache Father     Heart Disease Father     Hypertension Father     Migraines Father     Cancer Maternal Grandmother     Breast Cancer Maternal Grandmother         52's    Cancer Paternal Grandmother     Attention Deficit Hyperactivity Disorder Daughter     No Known Problems Daughter     Heart defect Daughter Passed away in infancy.  No Known Problems Daughter     No Known Problems Daughter     Schizophrenia Son    [facility] Other Son         autism    Other Son         autism         REVIEW OF SYSTEMS:  Review of Systems - Negative except for documented in the HPI. PHYSICAL EXAMINATION:  /86 (BP 1 Location: Right arm, BP Patient Position: Sitting, BP Cuff Size: Adult)   Pulse 91   Temp 97.2 °F (36.2 °C) (Temporal)   Resp 18   Ht 5' 6\" (1.676 m)   Wt 173 lb (78.5 kg)   SpO2 98%   BMI 27.92 kg/m²   Physical Exam  Vitals signs and nursing note reviewed. Constitutional:       Appearance: She is well-developed. HENT:      Head: Normocephalic and atraumatic. Eyes:      Conjunctiva/sclera: Conjunctivae normal.      Pupils: Pupils are equal, round, and reactive to light. Neck:      Musculoskeletal: Normal range of motion and neck supple. Cardiovascular:      Rate and Rhythm: Normal rate and regular rhythm. Pulmonary:      Effort: Pulmonary effort is normal.      Breath sounds: Normal breath sounds. Abdominal:      General: Bowel sounds are normal.      Palpations: Abdomen is soft.    Musculoskeletal: Normal range of motion. Back:    Skin:     General: Skin is warm and dry. Neurological:      Mental Status: She is alert and oriented to person, place, and time. Deep Tendon Reflexes: Babinski sign absent on the right side. Babinski sign absent on the left side. Reflex Scores:       Patellar reflexes are 1+ on the right side and 2+ on the left side. Achilles reflexes are 1+ on the right side and 2+ on the left side. Psychiatric:         Behavior: Behavior normal.         Thought Content: Thought content normal.         Judgment: Judgment normal.         LABORATORY DATA:  Pending/Ordered      IMPRESSION AND PLAN:   Diagnoses and all orders for this visit:    1. Well woman exam (no gynecological exam)   Anticipatory guidance discussed. Immunizations reviewed   HM updated. 2. Encounter for screening mammogram for malignant neoplasm of breast  -     [facility] MAMMO RT SCREENING INCL CAD; Future    3. Acquired hypothyroidism  -     THYROID CASCADE PROFILE; Future    4. Mixed hyperlipidemia  -     METABOLIC PANEL, COMPREHENSIVE; Future  -     CBC WITH AUTOMATED DIFF; Future  -     LIPID PANEL; Future    5. Vitamin D deficiency  -     VITAMIN D, 25 HYDROXY; Future    6. Tobacco dependence  -     varenicline (CHANTIX STARTER LAURA) 0.5 mg (11)- 1 mg (42) DsPk; Use as directed    7. Encounter for hepatitis C screening test for low risk patient  -     HEPATITIS C AB; Future    8. Bilateral hip pain  Comments:  L>>R. Ongoing for months. No trauma  Orders:  -     XR HIP LT W OR WO PELV 2-3 VWS; Future    9. Chronic bilateral low back pain with left-sided sciatica  Comments:  L>>R. Ongoing for months. No trauma. Not getting better with conservative measrues. Orders:  -     XR SPINE LUMB 2 OR 3 V; Future    10. Muscle spasm  -     tiZANidine (ZANAFLEX) 2 mg tablet; 1-2 tablets, 1-2 hours prior to bedtime. Indications: muscle spasm    11.  Screening mammogram, encounter for  -     [facility] MAMMO BI SCREENING INCL CAD; Future    I have discussed the diagnosis with the patient and the intended treatment plan as seen in the above orders. The patient has received an after-visit summary and questions were answered concerning future plans. Asked to return should symptoms worsen or not improve with treatment. Any pending labs and studies will be relayed to patient when they become available. Pt verbalizes understanding of plan of care and denies further questions or concerns at this time. Follow-up and Dispositions    · Return in about 1 year (around 3/9/2022), or if symptoms worsen or fail to improve, for Follow up 6 months for chronic issues. Catracho Faye MD    Patient Instructions     Acute Back Pain Care  1. Salon Pas 4% Lidocaine patches. Apply directly to the area of discomfort. Leave on for 12 hours. Off for 12 hours. 2. Motrin or Ibuprofen (if not contraindicated) 600-800 mgs. WITH FOOD every 8 hours for 1-2 days, then as needed every 8 hours. OR Tylenol 650 mgs 3 x daily. 3. Consider a TENS unit. This can be purchased OTC. 4. Warm compress or heating pad. Avoid high temperatures and getting burned. Monitor closely. 5. Muscle Relaxant as prescribed (Tizanidine)   6. Consider PT if not getting better. We will get an xray today. 7. Gentle stretching exercises as per the after visit summary. 8.  Return as needed. Back Pain: Care Instructions  Your Care Instructions     Back pain has many possible causes. It is often related to problems with muscles and ligaments of the back. It may also be related to problems with the nerves, discs, or bones of the back. Moving, lifting, standing, sitting, or sleeping in an awkward way can strain the back. Sometimes you don't notice the injury until later. Arthritis is another common cause of back pain. Although it may hurt a lot, back pain usually improves on its own within several weeks. Most people recover in 12 weeks or less.  Using good home treatment and being careful not to stress your back can help you feel better sooner. Follow-up care is a key part of your treatment and safety. Be sure to make and go to all appointments, and call your doctor if you are having problems. It's also a good idea to know your test results and keep a list of the medicines you take. How can you care for yourself at home? · Sit or lie in positions that are most comfortable and reduce your pain. Try one of these positions when you lie down:  ? Lie on your back with your knees bent and supported by large pillows. ? Lie on the floor with your legs on the seat of a sofa or chair. ? Lie on your side with your knees and hips bent and a pillow between your legs. ? Lie on your stomach if it does not make pain worse. · Do not sit up in bed, and avoid soft couches and twisted positions. Bed rest can help relieve pain at first, but it delays healing. Avoid bed rest after the first day of back pain. · Change positions every 30 minutes. If you must sit for long periods of time, take breaks from sitting. Get up and walk around, or lie in a comfortable position. · Try using a heating pad on a low or medium setting for 15 to 20 minutes every 2 or 3 hours. Try a warm shower in place of one session with the heating pad. · You can also try an ice pack for 10 to 15 minutes every 2 to 3 hours. Put a thin cloth between the ice pack and your skin. · Take pain medicines exactly as directed. ? If the doctor gave you a prescription medicine for pain, take it as prescribed. ? If you are not taking a prescription pain medicine, ask your doctor if you can take an over-the-counter medicine. · Take short walks several times a day. You can start with 5 to 10 minutes, 3 or 4 times a day, and work up to longer walks. Walk on level surfaces and avoid hills and stairs until your back is better. · Return to work and other activities as soon as you can.  Continued rest without activity is usually not good for your back. · To prevent future back pain, do exercises to stretch and strengthen your back and stomach. Learn how to use good posture, safe lifting techniques, and proper body mechanics. When should you call for help? Call your doctor now or seek immediate medical care if:    · You have new or worsening numbness in your legs.     · You have new or worsening weakness in your legs. (This could make it hard to stand up.)     · You lose control of your bladder or bowels. Watch closely for changes in your health, and be sure to contact your doctor if:    · You have a fever, lose weight, or don't feel well.     · You do not get better as expected. Where can you learn more? Go to http://www.garcia.com/  Enter I594 in the search box to learn more about \"Back Pain: Care Instructions. \"  Current as of: March 2, 2020               Content Version: 12.6  © 7723-0222 [employer]. Care instructions adapted under license by [employer] (which disclaims liability or warranty for this information). If you have questions about a medical condition or this instruction, always ask your healthcare professional. [place] [address] any warranty or liability for your use of this information.

## 2021-03-09 NOTE — PATIENT INSTRUCTIONS
Acute Back Pain Care  1. Salon Pas 4% Lidocaine patches. Apply directly to the area of discomfort. Leave on for 12 hours. Off for 12 hours. 2. Motrin or Ibuprofen (if not contraindicated) 600-800 mgs. WITH FOOD every 8 hours for 1-2 days, then as needed every 8 hours. OR Tylenol 650 mgs 3 x daily. 3. Consider a TENS unit. This can be purchased OTC. 4. Warm compress or heating pad. Avoid high temperatures and getting burned. Monitor closely. 5. Muscle Relaxant as prescribed (Tizanidine)   6. Consider PT if not getting better. We will get an xray today. 7. Gentle stretching exercises as per the after visit summary. 8.  Return as needed. Back Pain: Care Instructions  Your Care Instructions     Back pain has many possible causes. It is often related to problems with muscles and ligaments of the back. It may also be related to problems with the nerves, discs, or bones of the back. Moving, lifting, standing, sitting, or sleeping in an awkward way can strain the back. Sometimes you don't notice the injury until later. Arthritis is another common cause of back pain. Although it may hurt a lot, back pain usually improves on its own within several weeks. Most people recover in 12 weeks or less. Using good home treatment and being careful not to stress your back can help you feel better sooner. Follow-up care is a key part of your treatment and safety. Be sure to make and go to all appointments, and call your doctor if you are having problems. It's also a good idea to know your test results and keep a list of the medicines you take. How can you care for yourself at home? · Sit or lie in positions that are most comfortable and reduce your pain. Try one of these positions when you lie down:  ? Lie on your back with your knees bent and supported by large pillows. ? Lie on the floor with your legs on the seat of a sofa or chair.   ? Lie on your side with your knees and hips bent and a pillow between your legs. ? Lie on your stomach if it does not make pain worse. · Do not sit up in bed, and avoid soft couches and twisted positions. Bed rest can help relieve pain at first, but it delays healing. Avoid bed rest after the first day of back pain. · Change positions every 30 minutes. If you must sit for long periods of time, take breaks from sitting. Get up and walk around, or lie in a comfortable position. · Try using a heating pad on a low or medium setting for 15 to 20 minutes every 2 or 3 hours. Try a warm shower in place of one session with the heating pad. · You can also try an ice pack for 10 to 15 minutes every 2 to 3 hours. Put a thin cloth between the ice pack and your skin. · Take pain medicines exactly as directed. ? If the doctor gave you a prescription medicine for pain, take it as prescribed. ? If you are not taking a prescription pain medicine, ask your doctor if you can take an over-the-counter medicine. · Take short walks several times a day. You can start with 5 to 10 minutes, 3 or 4 times a day, and work up to longer walks. Walk on level surfaces and avoid hills and stairs until your back is better. · Return to work and other activities as soon as you can. Continued rest without activity is usually not good for your back. · To prevent future back pain, do exercises to stretch and strengthen your back and stomach. Learn how to use good posture, safe lifting techniques, and proper body mechanics. When should you call for help? Call your doctor now or seek immediate medical care if:    · You have new or worsening numbness in your legs.     · You have new or worsening weakness in your legs. (This could make it hard to stand up.)     · You lose control of your bladder or bowels. Watch closely for changes in your health, and be sure to contact your doctor if:    · You have a fever, lose weight, or don't feel well.     · You do not get better as expected. Where can you learn more?   Go to http://www.gray.com/  Enter E029 in the search box to learn more about \"Back Pain: Care Instructions. \"  Current as of: March 2, 2020               Content Version: 12.6  © 5894-3459 [employer]. Care instructions adapted under license by [employer] (which disclaims liability or warranty for this information). If you have questions about a medical condition or this instruction, always ask your healthcare professional. [place] [address] any warranty or liability for your use of this information.

## 2021-03-10 LAB
25(OH)D3 SERPL-MCNC: 13.4 NG/ML (ref 30–100)
ALBUMIN SERPL-MCNC: 4.2 G/DL (ref 3.5–5)
ALBUMIN/GLOB SERPL: 1.3 {RATIO} (ref 1.1–2.2)
ALP SERPL-CCNC: 60 U/L (ref 45–117)
ALT SERPL-CCNC: 20 U/L (ref 12–78)
ANION GAP SERPL CALC-SCNC: 5 MMOL/L (ref 5–15)
AST SERPL-CCNC: 13 U/L (ref 15–37)
BASOPHILS # BLD: 0.1 K/UL (ref 0–0.1)
BASOPHILS NFR BLD: 1 % (ref 0–1)
BILIRUB SERPL-MCNC: 0.3 MG/DL (ref 0.2–1)
BUN SERPL-MCNC: 18 MG/DL (ref 6–20)
BUN/CREAT SERPL: 23 (ref 12–20)
CALCIUM SERPL-MCNC: 9.1 MG/DL (ref 8.5–10.1)
CHLORIDE SERPL-SCNC: 107 MMOL/L (ref 97–108)
CHOLEST SERPL-MCNC: 211 MG/DL
CO2 SERPL-SCNC: 23 MMOL/L (ref 21–32)
CREAT SERPL-MCNC: 0.77 MG/DL (ref 0.55–1.02)
DIFFERENTIAL METHOD BLD: ABNORMAL
EOSINOPHIL # BLD: 0.3 K/UL (ref 0–0.4)
EOSINOPHIL NFR BLD: 4 % (ref 0–7)
ERYTHROCYTE [DISTWIDTH] IN BLOOD BY AUTOMATED COUNT: 16.8 % (ref 11.5–14.5)
GLOBULIN SER CALC-MCNC: 3.2 G/DL (ref 2–4)
GLUCOSE SERPL-MCNC: 100 MG/DL (ref 65–100)
HCT VFR BLD AUTO: 38.6 % (ref 35–47)
HCV AB SERPL QL IA: NONREACTIVE
HCV COMMENT,HCGAC: NORMAL
HDLC SERPL-MCNC: 84 MG/DL
HDLC SERPL: 2.5 {RATIO} (ref 0–5)
HGB BLD-MCNC: 11.7 G/DL (ref 11.5–16)
IMM GRANULOCYTES # BLD AUTO: 0 K/UL (ref 0–0.04)
IMM GRANULOCYTES NFR BLD AUTO: 1 % (ref 0–0.5)
LDLC SERPL CALC-MCNC: 115.6 MG/DL (ref 0–100)
LIPID PROFILE,FLP: ABNORMAL
LYMPHOCYTES # BLD: 1.8 K/UL (ref 0.8–3.5)
LYMPHOCYTES NFR BLD: 27 % (ref 12–49)
MCH RBC QN AUTO: 24.2 PG (ref 26–34)
MCHC RBC AUTO-ENTMCNC: 30.3 G/DL (ref 30–36.5)
MCV RBC AUTO: 79.9 FL (ref 80–99)
MONOCYTES # BLD: 0.4 K/UL (ref 0–1)
MONOCYTES NFR BLD: 7 % (ref 5–13)
NEUTS SEG # BLD: 4.1 K/UL (ref 1.8–8)
NEUTS SEG NFR BLD: 60 % (ref 32–75)
NRBC # BLD: 0 K/UL (ref 0–0.01)
NRBC BLD-RTO: 0 PER 100 WBC
PLATELET # BLD AUTO: 280 K/UL (ref 150–400)
PMV BLD AUTO: 12.3 FL (ref 8.9–12.9)
POTASSIUM SERPL-SCNC: 4.6 MMOL/L (ref 3.5–5.1)
PROT SERPL-MCNC: 7.4 G/DL (ref 6.4–8.2)
RBC # BLD AUTO: 4.83 M/UL (ref 3.8–5.2)
SODIUM SERPL-SCNC: 135 MMOL/L (ref 136–145)
TRIGL SERPL-MCNC: 57 MG/DL (ref ?–150)
VLDLC SERPL CALC-MCNC: 11.4 MG/DL
WBC # BLD AUTO: 6.8 K/UL (ref 3.6–11)

## 2021-03-10 NOTE — PROGRESS NOTES
Please let patient know that she definitely has lumbar osteoarthritis. It is mild. I also recommend PT for this and can set this up for her. If she does not have a therapist in mind, I will find one for her. If she does, let me know.    Thanks,   Dr. Hannah Walsh

## 2021-03-10 NOTE — PROGRESS NOTES
Please let patient know that she has evidence of mild degenerative changes involving the hips. I would recommend PT.    Thanks,   Dr. Janet Ragsdale

## 2021-03-11 ENCOUNTER — TELEPHONE (OUTPATIENT)
Dept: FAMILY MEDICINE CLINIC | Age: 51
End: 2021-03-11

## 2021-03-11 LAB — TSH SERPL-ACNC: 1.31 UIU/ML (ref 0.45–4.5)

## 2021-03-11 NOTE — TELEPHONE ENCOUNTER
----- Message from Ty Lombardi MD sent at 3/10/2021  3:44 PM EST -----  Please let patient know that she definitely has lumbar osteoarthritis. It is mild. I also recommend PT for this and can set this up for her. If she does not have a therapist in mind, I will find one for her. If she does, let me know.    Thanks,   Dr. Farhat Shah

## 2021-03-11 NOTE — TELEPHONE ENCOUNTER
Relayed result messages. She understood and will call with a place for PT that her insurance covers.

## 2021-03-11 NOTE — TELEPHONE ENCOUNTER
----- Message from Olamide Garcia MD sent at 3/10/2021  3:41 PM EST -----  Please let patient know that she has evidence of mild degenerative changes involving the hips. I would recommend PT.    Thanks,   Dr. Grant Zuleta

## 2021-03-16 ENCOUNTER — TELEPHONE (OUTPATIENT)
Dept: FAMILY MEDICINE CLINIC | Age: 51
End: 2021-03-16

## 2021-03-16 DIAGNOSIS — M25.552 BILATERAL HIP PAIN: Primary | ICD-10-CM

## 2021-03-16 DIAGNOSIS — G89.29 CHRONIC BILATERAL LOW BACK PAIN WITHOUT SCIATICA: ICD-10-CM

## 2021-03-16 DIAGNOSIS — M25.551 BILATERAL HIP PAIN: Primary | ICD-10-CM

## 2021-03-16 DIAGNOSIS — M54.50 CHRONIC BILATERAL LOW BACK PAIN WITHOUT SCIATICA: ICD-10-CM

## 2021-03-16 NOTE — TELEPHONE ENCOUNTER
NEEDS DOCTORS ORDER PUT IN SYSTEM - PT DOES NOT REQUIRE INSURANCE REFERRAL    ----- Message from Steve Guadarrama sent at 3/16/2021  8:40 AM EDT -----  Regarding: Dr. Fortino Gan Telephone  Referral    Caller (first and last name if not the patient or from practice):pt      Caller's relationship to patient (if not from a practice):pt      Name of caller (if calling from a practice):pt      Name of practice: [facility]      Specialist's title, first, and last name: unknown      Office Phone Number: [phone]      Fax number:unknown      Date and time of appointment:3/18/21      Reason for appointment: Lumbar and Bilat Hip Pain      Details to clarify the request: Pt states its right down the road from the office      Steve Guadarrama

## 2021-06-17 ENCOUNTER — HOSPITAL ENCOUNTER (OUTPATIENT)
Dept: MAMMOGRAPHY | Age: 51
Discharge: HOME | End: 2021-06-17
Attending: INTERNAL MEDICINE
Payer: MEDICAID

## 2021-06-17 ENCOUNTER — HOSPITAL ENCOUNTER (EMERGENCY)
Age: 51
Discharge: HOME | End: 2021-06-17
Attending: EMERGENCY MEDICINE
Payer: MEDICAID

## 2021-06-17 VITALS
BODY MASS INDEX: 27.49 KG/M2 | SYSTOLIC BLOOD PRESSURE: 134 MMHG | HEART RATE: 92 BPM | WEIGHT: 171.08 LBS | DIASTOLIC BLOOD PRESSURE: 70 MMHG | RESPIRATION RATE: 16 BRPM | HEIGHT: 66 IN | TEMPERATURE: 98.1 F | OXYGEN SATURATION: 98 %

## 2021-06-17 DIAGNOSIS — T16.2XXA FB EAR, LEFT, INITIAL ENCOUNTER: Primary | ICD-10-CM

## 2021-06-17 DIAGNOSIS — Z12.31 SCREENING MAMMOGRAM, ENCOUNTER FOR: ICD-10-CM

## 2021-06-17 PROCEDURE — 77067 SCR MAMMO BI INCL CAD: CPT

## 2021-06-17 PROCEDURE — 99283 EMERGENCY DEPT VISIT LOW MDM: CPT

## 2021-06-17 NOTE — ED NOTES
Reviewed discharge instructions with patients, verbalized understanding and no further questions, declined wheelchair, ambulated out of department without difficulties

## 2021-06-17 NOTE — ED TRIAGE NOTES
Patient presents ambulatory to treatment area with a steady gait. Patient states she felt a bug fly into her left ear at about 1730. Patient states she believes the bug is still in the ear. Patient states she can feel something moving in her ear and occasionally feels some pain in the left ear.

## 2021-06-17 NOTE — ED PROVIDER NOTES
53-year-old female with a past medical history significant for anxiety, depression, arrhythmia who presents to the ED for evaluation for left ear pain sustained this afternoon. The patient states that a bug flew in her left ear and has been causing increased pain and buzzing sensation. She denies any decreased hearing, headache, fever and chills, cough or congestion, sore throat, any other symptoms at this time. Past Medical History:   Diagnosis Date    Abnormal Pap smear     askis in 2001    Heart abnormality     arrythmias with prev twin gestation; now resolved.     Psychiatric problem     anxiety and depression, no treatment for the last 5 years       Past Surgical History:   Procedure Laterality Date    HX TUBAL LIGATION  2016    [place] ANESTH,DX ARTHROSCOPIC PROC KNEE JOINT      left knee 1992         Family History:   Problem Relation Age of Onset    Elevated Lipids Father     Headache Father     Heart Disease Father     Hypertension Father     Migraines Father     Cancer Maternal Grandmother     Breast Cancer Maternal Grandmother         52's    Cancer Paternal Grandmother     Attention Deficit Hyperactivity Disorder Daughter     No Known Problems Daughter     Heart defect Daughter     No Known Problems Daughter     No Known Problems Daughter     Schizophrenia Son     Other Son         autism    Other Son         autism       Social History     Socioeconomic History    Marital status:      Spouse name: Marie Su Number of children: 9    Years of education: Not on file    Highest education level: Not on file   Occupational History    Not on file   Tobacco Use    Smoking status: Current Every Day Smoker     Packs/day: 1.00     Years: 25.00     Pack years: 25.00     Types: Cigarettes    Smokeless tobacco: Never Used    Tobacco comment: Patient is willing to try chantix   Vaping Use    Vaping Use: Never used   Substance and Sexual Activity    Alcohol use: No    Drug use: No    Sexual activity: Yes     Partners: Male     Birth control/protection: Condom   Other Topics Concern    Not on file   Social History Narrative    Not on file     Social Determinants of Health     Financial Resource Strain:     Difficulty of Paying Living Expenses:    Food Insecurity:     Worried About Running Out of Food in the Last Year:     [address] in the Last Year:    Transportation Needs:     Lack of Transportation (Medical):  Lack of Transportation (Non-Medical):    Physical Activity:     Days of Exercise per Week:     Minutes of Exercise per Session:    Stress:     Feeling of Stress :    Social Connections:     Frequency of Communication with Friends and Family:     Frequency of Social Gatherings with Friends and Family:     Attends Yazdanism Services:     Active Member of Clubs or Organizations:     Attends Club or Organization Meetings:     Marital Status:    Intimate Partner Violence:     Fear of Current or Ex-Partner:     Emotionally Abused:     Physically Abused:     Sexually Abused: ALLERGIES: Erythromycin, Codeine, Pcn [penicillins], and Phenergan [promethazine]    Review of Systems   All other systems reviewed and are negative. Vitals:    06/17/21 1839   BP: (!) 150/75   Pulse: 92   Resp: 16   Temp: 98.1 °F (36.7 °C)   SpO2: 99%   Weight: 77.6 kg (171 lb 1.2 oz)   Height: 5' 6\" (1.676 m)            Physical Exam  Vitals and nursing note reviewed. Exam conducted with a chaperone present. CONSTITUTIONAL: Well-appearing; well-nourished; in no apparent distress  HEAD: Normocephalic; atraumatic  EYES: PERRL; EOM intact; conjunctiva and sclera are clear bilaterally. ENT: Normal right ear exam; no foreign body seen in the left ear however there is a superficial abrasion in the external canal of the left ear with dried blood.   TM is within normal limits no rhinorrhea; normal pharynx with no tonsillar hypertrophy; mucous membranes pink/moist, no erythema, no exudate. NECK: Supple; non-tender; no cervical lymphadenopathy  CARD: Normal S1, S2; no murmurs, rubs, or gallops. Regular rate and rhythm. RESP: Normal respiratory effort; breath sounds clear and equal bilaterally; no wheezes, rhonchi, or rales. ABD: Normal bowel sounds; non-distended; non-tender; no palpable organomegaly, no masses, no bruits. Back Exam: Normal inspection; no vertebral point tenderness, no CVA tenderness. Normal range of motion. EXT: Normal ROM in all four extremities; non-tender to palpation; no swelling or deformity; distal pulses are normal, no edema. SKIN: Warm; dry; no rash. NEURO:Alert and oriented x 3, coherent, EYAD-XII grossly intact, sensory and motor are non-focal.        MDM  Number of Diagnoses or Management Options  FB ear, left, initial encounter  Diagnosis management comments: Assessment: Foreign body sensation to left ear with normal exam and stable vital signs. No foreign body found in the left ear. Superficial abrasion to the external canal of the left ear. Plan: Education, reassurance, symptomatic treatment discharge with outpatient referral and analgesia as needed/ Monitor and Reevaluate.        Amount and/or Complexity of Data Reviewed  Clinical lab tests: ordered and reviewed  Tests in the radiology section of CPT®: ordered and reviewed  Tests in the medicine section of CPT®: reviewed and ordered  Discussion of test results with the performing providers: yes  Decide to obtain previous medical records or to obtain history from someone other than the patient: yes  Obtain history from someone other than the patient: yes  Review and summarize past medical records: yes  Discuss the patient with other providers: yes  Independent visualization of images, tracings, or specimens: yes    Risk of Complications, Morbidity, and/or Mortality  Presenting problems: low  Diagnostic procedures: low  Management options: low    Patient Progress  Patient progress: stable         Procedures    Progress Note:   Pt has been reexamined by Amor Wilburn MD. Pt is feeling much better. Symptoms have improved. All available results have been reviewed with pt and any available family. Pt understands sx, dx, and tx in ED. Care plan has been outlined and questions have been answered. Pt is ready to go home. Will send home on left ear foreign body instruction. Outpatient referral with PCP as needed. Written by Amor Wilburn MD,7:10 PM    .   .

## 2022-04-26 NOTE — ED NOTES
Pt resting, lying flat on stretcher. Ketoconazole Pregnancy And Lactation Text: This medication is Pregnancy Category C and it isn't know if it is safe during pregnancy. It is also excreted in breast milk and breast feeding isn't recommended.

## 2022-07-21 ENCOUNTER — TRANSCRIBE ORDER (OUTPATIENT)
Dept: MAMMOGRAPHY | Age: 52
End: 2022-07-21

## 2022-07-21 DIAGNOSIS — Z12.31 VISIT FOR SCREENING MAMMOGRAM: Primary | ICD-10-CM

## 2022-08-30 ENCOUNTER — HOSPITAL ENCOUNTER (OUTPATIENT)
Dept: MAMMOGRAPHY | Age: 52
Discharge: HOME | End: 2022-08-30
Payer: MEDICAID

## 2022-08-30 DIAGNOSIS — Z12.31 VISIT FOR SCREENING MAMMOGRAM: ICD-10-CM

## 2022-08-30 PROCEDURE — 77067 SCR MAMMO BI INCL CAD: CPT

## 2022-09-06 ENCOUNTER — APPOINTMENT (OUTPATIENT)
Dept: GENERAL RADIOLOGY | Age: 52
End: 2022-09-06
Attending: EMERGENCY MEDICINE
Payer: MEDICAID

## 2022-09-06 ENCOUNTER — HOSPITAL ENCOUNTER (EMERGENCY)
Age: 52
Discharge: HOME | End: 2022-09-06
Attending: EMERGENCY MEDICINE
Payer: MEDICAID

## 2022-09-06 VITALS
SYSTOLIC BLOOD PRESSURE: 139 MMHG | OXYGEN SATURATION: 97 % | RESPIRATION RATE: 18 BRPM | BODY MASS INDEX: 25.86 KG/M2 | HEART RATE: 79 BPM | WEIGHT: 160.94 LBS | HEIGHT: 66 IN | DIASTOLIC BLOOD PRESSURE: 70 MMHG | TEMPERATURE: 99.2 F

## 2022-09-06 DIAGNOSIS — R07.9 CHEST PAIN, UNSPECIFIED TYPE: Primary | ICD-10-CM

## 2022-09-06 LAB
ANION GAP SERPL CALC-SCNC: 11 MMOL/L (ref 5–15)
ATRIAL RATE: 97 BPM
BASOPHILS # BLD: 0 K/UL (ref 0–0.1)
BASOPHILS NFR BLD: 1 % (ref 0–1)
BUN SERPL-MCNC: 21 MG/DL (ref 6–20)
BUN/CREAT SERPL: 28 (ref 12–20)
CALCIUM SERPL-MCNC: 9.4 MG/DL (ref 8.5–10.1)
CALCULATED P AXIS, ECG09: 56 DEGREES
CALCULATED R AXIS, ECG10: 29 DEGREES
CALCULATED T AXIS, ECG11: 31 DEGREES
CHLORIDE SERPL-SCNC: 102 MMOL/L (ref 97–108)
CO2 SERPL-SCNC: 23 MMOL/L (ref 21–32)
CREAT SERPL-MCNC: 0.75 MG/DL (ref 0.55–1.02)
DIAGNOSIS, 93000: NORMAL
DIFFERENTIAL METHOD BLD: ABNORMAL
EOSINOPHIL # BLD: 0.2 K/UL (ref 0–0.4)
EOSINOPHIL NFR BLD: 3 % (ref 0–7)
ERYTHROCYTE [DISTWIDTH] IN BLOOD BY AUTOMATED COUNT: 16.1 % (ref 11.5–14.5)
GLUCOSE SERPL-MCNC: 101 MG/DL (ref 65–100)
HCT VFR BLD AUTO: 36.1 % (ref 35–47)
HGB BLD-MCNC: 10.9 G/DL (ref 11.5–16)
IMM GRANULOCYTES # BLD AUTO: 0 K/UL (ref 0–0.04)
IMM GRANULOCYTES NFR BLD AUTO: 1 % (ref 0–0.5)
LYMPHOCYTES # BLD: 2 K/UL (ref 0.8–3.5)
LYMPHOCYTES NFR BLD: 25 % (ref 12–49)
MCH RBC QN AUTO: 23.3 PG (ref 26–34)
MCHC RBC AUTO-ENTMCNC: 30.2 G/DL (ref 30–36.5)
MCV RBC AUTO: 77.3 FL (ref 80–99)
MONOCYTES # BLD: 0.5 K/UL (ref 0–1)
MONOCYTES NFR BLD: 6 % (ref 5–13)
NEUTS SEG # BLD: 5.2 K/UL (ref 1.8–8)
NEUTS SEG NFR BLD: 65 % (ref 32–75)
NRBC # BLD: 0 K/UL (ref 0–0.01)
NRBC BLD-RTO: 0 PER 100 WBC
P-R INTERVAL, ECG05: 122 MS
PLATELET # BLD AUTO: 283 K/UL (ref 150–400)
PMV BLD AUTO: 11.2 FL (ref 8.9–12.9)
POTASSIUM SERPL-SCNC: 3.9 MMOL/L (ref 3.5–5.1)
Q-T INTERVAL, ECG07: 324 MS
QRS DURATION, ECG06: 94 MS
QTC CALCULATION (BEZET), ECG08: 411 MS
RBC # BLD AUTO: 4.67 M/UL (ref 3.8–5.2)
SODIUM SERPL-SCNC: 136 MMOL/L (ref 136–145)
TROPONIN-HIGH SENSITIVITY: 4 NG/L (ref 0–51)
TROPONIN-HIGH SENSITIVITY: 5 NG/L (ref 0–51)
VENTRICULAR RATE, ECG03: 97 BPM
WBC # BLD AUTO: 8 K/UL (ref 3.6–11)

## 2022-09-06 PROCEDURE — 80048 BASIC METABOLIC PNL TOTAL CA: CPT

## 2022-09-06 PROCEDURE — 84484 ASSAY OF TROPONIN QUANT: CPT

## 2022-09-06 PROCEDURE — 36415 COLL VENOUS BLD VENIPUNCTURE: CPT

## 2022-09-06 PROCEDURE — 85025 COMPLETE CBC W/AUTO DIFF WBC: CPT

## 2022-09-06 PROCEDURE — 93005 ELECTROCARDIOGRAM TRACING: CPT

## 2022-09-06 PROCEDURE — 71045 X-RAY EXAM CHEST 1 VIEW: CPT

## 2022-09-06 PROCEDURE — 99285 EMERGENCY DEPT VISIT HI MDM: CPT

## 2022-09-06 NOTE — ED TRIAGE NOTES
Pt reports x2 months of persistent chest pain in the center of her chest.  Sts she's had 2 episodes of worsening chest pain while exerting herself during that time, last episode Saturday. Pt sts she had to wait for her kids to go back to school to be able to come to ER. Denies SOB.

## 2022-09-06 NOTE — ED PROVIDER NOTES
52F w/ hx smoking p/w 2months of chest pain. Pt reports 2 months of near constant chest pain that became worse 2nights ago while she was carrying packages up the steps in her son's college dorm room. Described as sharp and burning feeling mid to left chest. Associated w/ lightheadedness but no diaphoresis, syncope, dyspnea. NO F/C, cough, N/V/D. No falls or chest wall trauma. Current smoker but no drugs/etoh. Denies any prior personal cardiac hx. Father hx of CAD s/ stent placement. No recent surgeries, hospitalizations, travel, hx of malignancy, exogenous estrogen use, hemoptysis, LE pain/swelling, or hx of PE/DVT. Past Medical History:   Diagnosis Date    Abnormal Pap smear     askis in 2001    Heart abnormality     arrythmias with prev twin gestation; now resolved.     Psychiatric problem     anxiety and depression, no treatment for the last 5 years       Past Surgical History:   Procedure Laterality Date    HX TUBAL LIGATION  2016    [place] ANESTH,DX ARTHROSCOPIC PROC KNEE JOINT      left knee 1992         Family History:   Problem Relation Age of Onset    Breast Cancer Mother     Attention Deficit Hyperactivity Disorder Daughter     No Known Problems Daughter     Heart defect Daughter     No Known Problems Daughter     No Known Problems Daughter     Cancer Maternal Grandmother     Breast Cancer Maternal Grandmother         52's    Cancer Paternal Grandmother     Elevated Lipids Father     Headache Father     Heart Disease Father     Hypertension Father     Migraines Father     Schizophrenia Son     Other Son         autism    Other Son         autism       Social History     Socioeconomic History    Marital status:      Spouse name: Willie Ponce    Number of children: 7    Years of education: Not on file    Highest education level: Not on file   Occupational History    Not on file   Tobacco Use    Smoking status: Every Day     Packs/day: 1.00     Years: 25.00     Pack years: 25.00     Types: Cigarettes Smokeless tobacco: Never    Tobacco comments:     Patient is willing to try chantix   Vaping Use    Vaping Use: Never used   Substance and Sexual Activity    Alcohol use: No    Drug use: No    Sexual activity: Yes     Partners: Male     Birth control/protection: Condom   Other Topics Concern    Not on file   Social History Narrative    Not on file     Social Determinants of Health     Financial Resource Strain: Not on file   Food Insecurity: Not on file   Transportation Needs: Not on file   Physical Activity: Not on file   Stress: Not on file   Social Connections: Not on file   Intimate Partner Violence: Not on file   Housing Stability: Not on file         ALLERGIES: Erythromycin, Codeine, Pcn [penicillins], and Phenergan [promethazine]    Review of Systems   Constitutional:  Negative for chills, diaphoresis and fever. HENT:  Negative for facial swelling, mouth sores, nosebleeds, trouble swallowing and voice change. Eyes:  Negative for pain and visual disturbance. Respiratory:  Negative for apnea, cough, choking, shortness of breath, wheezing and stridor. Cardiovascular:  Negative for chest pain, palpitations and leg swelling. Gastrointestinal:  Negative for abdominal distention, abdominal pain, blood in stool, diarrhea, nausea and vomiting. Genitourinary:  Negative for difficulty urinating, dysuria, flank pain, hematuria and pelvic pain. Musculoskeletal:  Negative for joint swelling. Skin:  Negative for color change and rash. Allergic/Immunologic: Negative for immunocompromised state. Neurological:  Negative for dizziness, seizures, syncope, speech difficulty and light-headedness. Hematological:  Does not bruise/bleed easily. Psychiatric/Behavioral:  Negative for agitation and behavioral problems.       Vitals:    09/06/22 0848 09/06/22 0903 09/06/22 1003   BP: (!) 143/82 (!) 140/67 139/70   Pulse: 92 92 79   Resp: 16 18 18   Temp: 99.2 °F (37.3 °C)     SpO2: 98% 97% 97%   Weight: 73 kg (160 lb 15 oz)     Height: 5' 6\" (1.676 m)              Physical Exam  Vitals and nursing note reviewed. Constitutional:       General: She is not in acute distress. Appearance: Normal appearance. She is not ill-appearing or toxic-appearing. HENT:      Head: Normocephalic and atraumatic. Right Ear: External ear normal.      Left Ear: External ear normal.      Nose: Nose normal.      Mouth/Throat:      Mouth: Mucous membranes are moist.      Pharynx: Oropharynx is clear. No oropharyngeal exudate or posterior oropharyngeal erythema. Eyes:      General: No scleral icterus. Extraocular Movements: Extraocular movements intact. Conjunctiva/sclera: Conjunctivae normal.      Pupils: Pupils are equal, round, and reactive to light. Cardiovascular:      Rate and Rhythm: Normal rate and regular rhythm. Pulses: Normal pulses. Heart sounds: Normal heart sounds. No murmur heard. No friction rub. No gallop. Pulmonary:      Effort: Pulmonary effort is normal. No respiratory distress. Breath sounds: Normal breath sounds. No stridor. No wheezing, rhonchi or rales. Abdominal:      General: There is no distension. Palpations: Abdomen is soft. Tenderness: There is no abdominal tenderness. There is no guarding or rebound. Musculoskeletal:         General: No tenderness or deformity. Normal range of motion. Cervical back: Normal range of motion and neck supple. No rigidity. Right lower leg: No edema. Left lower leg: No edema. Skin:     General: Skin is warm. Capillary Refill: Capillary refill takes less than 2 seconds. Coloration: Skin is not jaundiced. Neurological:      General: No focal deficit present. Mental Status: She is alert. Cranial Nerves: No cranial nerve deficit. Sensory: No sensory deficit. Motor: No weakness.       Coordination: Coordination normal.   Psychiatric:         Mood and Affect: Mood normal.         Behavior: Behavior normal.         Thought Content: Thought content normal.         Judgment: Judgment normal.        EKG Interpretation   SR, narrow QRS, nl intervals, no AMELIA/STD/TWI  (EKG tracing interpreted by ED physician)    LABORATORY TESTS:  Admission on 09/06/2022, Discharged on 09/06/2022   Component Date Value Ref Range Status    Ventricular Rate 09/06/2022 97  BPM Final    Atrial Rate 09/06/2022 97  BPM Final    P-R Interval 09/06/2022 122  ms Final    QRS Duration 09/06/2022 94  ms Final    Q-T Interval 09/06/2022 324  ms Final    QTC Calculation (Bezet) 09/06/2022 411  ms Final    Calculated P Axis 09/06/2022 56  degrees Final    Calculated R Axis 09/06/2022 29  degrees Final    Calculated T Axis 09/06/2022 31  degrees Final    Diagnosis 09/06/2022    Final                    Value:Normal sinus rhythm  Incomplete right bundle branch block  Borderline ECG  No previous ECGs available  Confirmed by Carolyne Bowers M.D., Calista Morse ([id number]) on 9/6/2022 2:47:25 PM      WBC 09/06/2022 8.0  3.6 - 11.0 K/uL Final    RBC 09/06/2022 4.67  3.80 - 5.20 M/uL Final    HGB 09/06/2022 10.9 (A) 11.5 - 16.0 g/dL Final    HCT 09/06/2022 36.1  35.0 - 47.0 % Final    MCV 09/06/2022 77.3 (A) 80.0 - 99.0 FL Final    MCH 09/06/2022 23.3 (A) 26.0 - 34.0 PG Final    MCHC 09/06/2022 30.2  30.0 - 36.5 g/dL Final    RDW 09/06/2022 16.1 (A) 11.5 - 14.5 % Final    PLATELET 00/85/4877 502  150 - 400 K/uL Final    MPV 09/06/2022 11.2  8.9 - 12.9 FL Final    NRBC 09/06/2022 0.0  0.0  WBC Final    ABSOLUTE NRBC 09/06/2022 0.00  0.00 - 0.01 K/uL Final    NEUTROPHILS 09/06/2022 65  32 - 75 % Final    LYMPHOCYTES 09/06/2022 25  12 - 49 % Final    MONOCYTES 09/06/2022 6  5 - 13 % Final    EOSINOPHILS 09/06/2022 3  0 - 7 % Final    BASOPHILS 09/06/2022 1  0 - 1 % Final    IMMATURE GRANULOCYTES 09/06/2022 1 (A) 0 - 0.5 % Final    ABS. NEUTROPHILS 09/06/2022 5.2  1.8 - 8.0 K/UL Final    ABS. LYMPHOCYTES 09/06/2022 2.0  0.8 - 3.5 K/UL Final    ABS.  MONOCYTES 09/06/2022 0.5  0.0 - 1.0 K/UL Final    ABS. EOSINOPHILS 09/06/2022 0.2  0.0 - 0.4 K/UL Final    ABS. BASOPHILS 09/06/2022 0.0  0.0 - 0.1 K/UL Final    ABS. IMM. GRANS. 09/06/2022 0.0  0.00 - 0.04 K/UL Final    DF 09/06/2022 AUTOMATED    Final    Sodium 09/06/2022 136  136 - 145 mmol/L Final    Potassium 09/06/2022 3.9  3.5 - 5.1 mmol/L Final    Chloride 09/06/2022 102  97 - 108 mmol/L Final    CO2 09/06/2022 23  21 - 32 mmol/L Final    Anion gap 09/06/2022 11  5 - 15 mmol/L Final    Glucose 09/06/2022 101 (A) 65 - 100 mg/dL Final    BUN 09/06/2022 21 (A) 6 - 20 MG/DL Final    Creatinine 09/06/2022 0.75  0.55 - 1.02 MG/DL Final    BUN/Creatinine ratio 09/06/2022 28 (A) 12 - 20   Final    GFR est AA 09/06/2022 >60  >60 ml/min/1.73m2 Final    GFR est non-AA 09/06/2022 >60  >60 ml/min/1.73m2 Final    Estimated GFR is calculated using the IDMS-traceable Modification of Diet in Renal Disease (MDRD) Study equation, reported for both  Americans (GFRAA) and non- Americans (GFRNA), and normalized to 1.73m2 body surface area. The physician must decide which value applies to the patient. Calcium 09/06/2022 9.4  8.5 - 10.1 MG/DL Final    Troponin-High Sensitivity 09/06/2022 4  0 - 51 ng/L Final    Comment: A HS troponin value change of (+ or -) 50% or more below the 99th percentile, in a 1/2/3 hr interval represents a significant change. Clinical correcation is recommended. A HS troponin value change of (+ or -) 20% or above the 99th percentile, in a 1/2/3 hr interval represents a significant change. Clinical correlation is recommended. 99th Percentile:   Women: 0-51 ng/L                                                                Men:   0-76 ng/L      Troponin-High Sensitivity 09/06/2022 5  0 - 51 ng/L Final    Comment: A HS troponin value change of (+ or -) 50% or more below the 99th percentile, in a 1/2/3 hr interval represents a significant change. Clinical correcation is recommended.   A HS troponin value change of (+ or -) 20% or above the 99th percentile, in a 1/2/3 hr interval represents a significant change. Clinical correlation is recommended. 99th Percentile:   Women: 0-51 ng/L                                                                Men:   0-76 ng/L         IMAGING RESULTS:  XR CHEST PORT   Final Result   No acute findings. MEDICATIONS GIVEN:  Medications - No data to display    PROGRESS NOTE:   The patient's ED course has been uncomplicated    CONSULTS:  none    IMPRESSION:  1. Chest pain, unspecified type        PLAN:  - Discharge    Ana Amos MD      [facility]  Number of Diagnoses or Management Options  Chest pain, unspecified type  Diagnosis management comments: 52F w/ hx smoking p/w 2months of chest pain. Pt nontoxic appearing, afebrile, hemodynamically stable w/o resp distress or hypoxia. Ddx includes ACS vs cardiac dysrythmia vs pericarditis vs PNX vs PNA vs bronchitis/COPD/asthma vs CHF vs severe anemia vs HTN emergency/urgency vs gastritis/PUD/GERd vs pleurisy vs costochondritis, less likely PE based on Wells/geneva score and no tachycardia/hypoxia, much less likely esophageal rupture or AD/TAA based on presentation. Ordered CXR, EKG, labs. Monitor and reassess. 1130 Pt doing well and remains stable w/o any active chest pain. CXR unremarkable. EKG SR w/o ischemic changes. Trop neg x2. HEART score=3 (risk factors and age), low risk for MACE, doubt ACS. Low risk for PE by wells. Labs otherwise unremarkable. Shared decision making w/ pt who prefers to go home and f/u as outpatient w/ cardiology. Patient given specific return precautions and explained signs/symptoms for which to come back to ED immediately but otherwise advised to f/u w/ PCP over next 2-3days.        Amount and/or Complexity of Data Reviewed  Clinical lab tests: reviewed and ordered  Tests in the radiology section of CPT®: ordered and reviewed  Tests in the medicine section of CPT®: ordered and reviewed  Independent visualization of images, tracings, or specimens: yes    Risk of Complications, Morbidity, and/or Mortality  Presenting problems: moderate  Diagnostic procedures: moderate  Management options: moderate           Procedures

## 2022-09-06 NOTE — ED NOTES
Pt provided w/DC instructions re: Dx Chest pain. No new rx. Pt instructed to follow up with PCP and cards at first available appt. VSS. IV d/c.  Pt verbalized understanding of DC instructions. Denied questions/concerns. Well appearing. Ambulatory on DC w/strong, steady gait.

## 2022-09-13 ENCOUNTER — HOSPITAL ENCOUNTER (OUTPATIENT)
Age: 52
Setting detail: OUTPATIENT SURGERY
Discharge: HOME | End: 2022-09-13
Attending: SPECIALIST | Admitting: SPECIALIST
Payer: MEDICAID

## 2022-09-13 VITALS
HEIGHT: 66 IN | TEMPERATURE: 98.8 F | WEIGHT: 153.1 LBS | BODY MASS INDEX: 24.6 KG/M2 | DIASTOLIC BLOOD PRESSURE: 57 MMHG | HEART RATE: 74 BPM | SYSTOLIC BLOOD PRESSURE: 109 MMHG | OXYGEN SATURATION: 95 % | RESPIRATION RATE: 16 BRPM

## 2022-09-13 DIAGNOSIS — R07.9 CHEST PAIN, UNSPECIFIED TYPE: ICD-10-CM

## 2022-09-13 PROCEDURE — 77030003390 HC NDL ANGI MRTM -A: Performed by: SPECIALIST

## 2022-09-13 PROCEDURE — 77030041064 HC CATH DX ANGI DXTRTY MEDT -B: Performed by: SPECIALIST

## 2022-09-13 PROCEDURE — 74011000250 HC RX REV CODE- 250: Performed by: SPECIALIST

## 2022-09-13 PROCEDURE — 93458 L HRT ARTERY/VENTRICLE ANGIO: CPT | Performed by: SPECIALIST

## 2022-09-13 PROCEDURE — 77030029065 HC DRSG HEMO QCLOT ZMED -B

## 2022-09-13 PROCEDURE — 2709999900 HC NON-CHARGEABLE SUPPLY: Performed by: SPECIALIST

## 2022-09-13 PROCEDURE — 74011250636 HC RX REV CODE- 250/636: Performed by: SPECIALIST

## 2022-09-13 PROCEDURE — 99152 MOD SED SAME PHYS/QHP 5/>YRS: CPT | Performed by: SPECIALIST

## 2022-09-13 PROCEDURE — C1894 INTRO/SHEATH, NON-LASER: HCPCS | Performed by: SPECIALIST

## 2022-09-13 RX ORDER — FENTANYL CITRATE 50 UG/ML
INJECTION, SOLUTION INTRAMUSCULAR; INTRAVENOUS AS NEEDED
Status: DISCONTINUED | OUTPATIENT
Start: 2022-09-13 | End: 2022-09-13 | Stop reason: HOSPADM

## 2022-09-13 RX ORDER — SODIUM CHLORIDE 9 MG/ML
125 INJECTION, SOLUTION INTRAVENOUS CONTINUOUS
Status: DISPENSED | OUTPATIENT
Start: 2022-09-13 | End: 2022-09-13

## 2022-09-13 RX ORDER — HYDROCORTISONE SODIUM SUCCINATE 100 MG/2ML
100 INJECTION, POWDER, FOR SOLUTION INTRAMUSCULAR; INTRAVENOUS
Status: DISCONTINUED | OUTPATIENT
Start: 2022-09-13 | End: 2022-09-13 | Stop reason: HOSPADM

## 2022-09-13 RX ORDER — ATROPINE SULFATE 0.1 MG/ML
1 INJECTION INTRAVENOUS ONCE
Status: COMPLETED | OUTPATIENT
Start: 2022-09-13 | End: 2022-09-13

## 2022-09-13 RX ORDER — LIDOCAINE HYDROCHLORIDE 10 MG/ML
INJECTION INFILTRATION; PERINEURAL AS NEEDED
Status: DISCONTINUED | OUTPATIENT
Start: 2022-09-13 | End: 2022-09-13 | Stop reason: HOSPADM

## 2022-09-13 RX ORDER — ALBUTEROL SULFATE 90 UG/1
AEROSOL, METERED RESPIRATORY (INHALATION)
COMMUNITY

## 2022-09-13 RX ORDER — ASPIRIN 81 MG/1
81 TABLET ORAL DAILY
COMMUNITY

## 2022-09-13 RX ORDER — DIPHENHYDRAMINE HYDROCHLORIDE 50 MG/ML
25 INJECTION, SOLUTION INTRAMUSCULAR; INTRAVENOUS
Status: DISCONTINUED | OUTPATIENT
Start: 2022-09-13 | End: 2022-09-13 | Stop reason: HOSPADM

## 2022-09-13 RX ORDER — MIDAZOLAM HYDROCHLORIDE 1 MG/ML
INJECTION, SOLUTION INTRAMUSCULAR; INTRAVENOUS AS NEEDED
Status: DISCONTINUED | OUTPATIENT
Start: 2022-09-13 | End: 2022-09-13 | Stop reason: HOSPADM

## 2022-09-13 RX ORDER — SODIUM CHLORIDE 9 MG/ML
75 INJECTION, SOLUTION INTRAVENOUS CONTINUOUS
Status: CANCELLED | OUTPATIENT
Start: 2022-09-13

## 2022-09-13 RX ORDER — HEPARIN SODIUM 200 [USP'U]/100ML
INJECTION, SOLUTION INTRAVENOUS
Status: COMPLETED | OUTPATIENT
Start: 2022-09-13 | End: 2022-09-13

## 2022-09-13 RX ORDER — SODIUM CHLORIDE 0.9 % (FLUSH) 0.9 %
5-40 SYRINGE (ML) INJECTION AS NEEDED
Status: DISCONTINUED | OUTPATIENT
Start: 2022-09-13 | End: 2022-09-13 | Stop reason: HOSPADM

## 2022-09-13 RX ORDER — SODIUM CHLORIDE 0.9 % (FLUSH) 0.9 %
5-40 SYRINGE (ML) INJECTION EVERY 8 HOURS
Status: DISCONTINUED | OUTPATIENT
Start: 2022-09-13 | End: 2022-09-13 | Stop reason: HOSPADM

## 2022-09-13 RX ORDER — ONDANSETRON 2 MG/ML
4 INJECTION INTRAMUSCULAR; INTRAVENOUS ONCE
Status: COMPLETED | OUTPATIENT
Start: 2022-09-13 | End: 2022-09-13

## 2022-09-13 RX ADMIN — ONDANSETRON 4 MG: 2 INJECTION INTRAMUSCULAR; INTRAVENOUS at 13:31

## 2022-09-13 RX ADMIN — ATROPINE SULFATE 1 MG: 0.1 INJECTION INTRAVENOUS at 13:39

## 2022-09-13 NOTE — DISCHARGE INSTRUCTIONS
Discharge Instructions:     Cardiac Catheterization/Angiography Discharge Instructions    *Check the puncture site frequently for swelling or bleeding. If you see any bleeding, lie down and apply pressure over the area and call 911. Notify your doctor for any redness, swelling, drainage or oozing from the puncture site. Notify your doctor for any fever or chills. *If the leg or arm with the puncture becomes cold, numb or painful, call your cardiologist.    *Activity should be limited for the next 48 hours. Climb stairs as little as possible and avoid any stooping, bending or strenuous activity for 48 hours. No heavy lifting (anything over 10 pounds) for five days. *Do not drive for 24 hours. *You may resume your usual diet. Drink more fluids than usual.    *Have a responsible person drive you home and stay with you for at least 24 hours after your heart catheterization/angiography. *You may remove the bandage from your groin in 24 hours. You may shower in 24 hours. No tub baths, hot tubs or swimming for one week. Do not place any lotions, creams, powders, ointments over the puncture site for one week. You may place a clean band-aid over the puncture site each day for 5 days. Change this daily. Medications: Activity:     As tolerated except do not lift over 10 pounds for 5 days. Diet:     American Heart Association. Follow-up:     Follow up with John Baker MD on September 20th, 2022 at [place] [address].  [facility]  [phone]      If you smoke, STOP!

## 2022-09-13 NOTE — PROCEDURES
Cath:  Normal cors. Normal LVF (EF 70%).   No AVG/MR  RFA manual    FU with Dr. Lucero Self 9/20/22 @ 2pm.

## 2022-09-13 NOTE — PROGRESS NOTES
1:20 PM  Patient was preparing for discharge and developed a small hematoma at the R groin site after ambulating. Once the groin site was palpated the patient started to feel nauseous and 'hot.' The patient was reattached to monitor leads and bp monitoring. Fluids were restarted as well. Atropine was also administered.

## 2022-09-13 NOTE — ROUTINE PROCESS
8:45 AM  Patient arrived. ID and allergies verified verbally with patient. Pt voices understanding of procedure to be performed. Consent obtained. Pt prepped for procedure. 10:25 AM  TRANSFER - OUT REPORT:    Verbal report given to Dorota BARCLAY(name) on [facility]  being transferred to cath lab(unit) for ordered procedure       Report consisted of patients Situation, Background, Assessment and   Recommendations(SBAR). Information from the following report(s) SBAR was reviewed with the receiving nurse. Lines:   Peripheral IV 09/13/22 Left Antecubital (Active)   Site Assessment Clean, dry, & intact 09/13/22 0913   Phlebitis Assessment 0 09/13/22 0913   Infiltration Assessment 0 09/13/22 0913   Dressing Status Clean, dry, & intact 09/13/22 0913   Dressing Type Transparent 09/13/22 0913   Hub Color/Line Status Blue 09/13/22 0913        Opportunity for questions and clarification was provided. Patient transported with:   Registered Nurse    11:03 AM  TRANSFER - IN REPORT:    Verbal report received from Lashay Sweeney RN(name) on [facility]  being received from cath lab(unit) for routine post - op      Report consisted of patients Situation, Background, Assessment and   Recommendations(SBAR). Information from the following report(s) SBAR was reviewed with the receiving nurse. Opportunity for questions and clarification was provided. Assessment completed upon patients arrival to unit and care assumed. 11:07 AM  Blood aspirated from sheath. 6 Fr sheath pulled from right Groin. Marina Sable applied. Manual pressure held by [employer]. 11:09 AM  Slight hematoma noted to left of puncture site, additional pressure held. 11:17 AM  Hemostasis achieved, site presents as clean dry and intact and is soft to palpitation. 1:03 PM  Discharge instructions reviewed with patient and family. Voiced understanding. Patient given copy of discharge instructions to take home.          1:15 PM  Patient ambulates in hallway or on unit. 1:20 PM  Once patient back from bathroom, complaints of groin pain at cath site. Upon assessment there was a small hematoma, manual pressure held by [employer]. When pressure applied, patient became symptomatic with nausea, diaphoresis, feeling 'hot, and BP and HR dropped, BP 85P systolic and HR 53. 201RC saline bolus administered, zofran, and atropine. Please see EMAR for administration times. Post intervention, patient reports feeling 'okay.' No longer has complaints of nausea or feeling 'hot.' Bedrest times reset. Sand bag applied to right groin. 1:45 PM  Manual pressure released. Patient laying flat and resting quietly in bed, no complaints at this time. 2:30 PM  Additional pressure held for 5 minutes during groin check due to site not being as soft as previously. 4:50 PM  Patient ambulates in hallway or on unit. 4:55 PM  Site check post ambulation, site presents as clean dry and intact and soft to palpitation. Patient states it \"is sore but not as bad as the last time I got up. \"    5:00 PM  Pt discharged via wheelchair with . Personal belongings with patient upon discharge.

## 2022-09-13 NOTE — H&P
Date of Surgery Update:  Anthony Hawkins was seen and examined. History and physical has been reviewed. The patient has been examined. There have been no significant clinical changes since the completion of the originally dated History and Physical.    Signed By: Jac Hicks MD     September 13, 2022 10:11 AM         CP worrisome for [place] 1970 Office/Outpatient VisitVisit Date: Mon, Sep 12, 2022 9:15 amProvider: Dayami Coker MD (Assistant: Private.Me, [address] [place] [address] )Location: [facility])[address] [place]. [address] [phone] signed by Juan Carlos Osman MD on  09/12/2022 09:43:54 AM                         Subjective:CC: Mrs. Fuentes Calvert is a 50year old White female. Her primary care physician is Luis Kellogg MD.  This is her first visit to the clinic. She has referred herself to our practice because she wants to establish a cardiologist.  She presents today with a complaint of chest pain and shortness of breath. She is here today following a transition of care from the emergency department. She was recently in the hospital: on a recent  ER visit on c/o chest pain at [facility] ER. HPI: abn ekg, progressive angina over the last month, er visit, not gets if vacuuming, for cath at [facility]  Regarding chest pain: The pain initially began two months ago. Regarding chest pain: The discomfort is located primarily in the center of the chest.  The pain initially began two months ago. Typically, individual episodes of chest pain last nearly all day long. She characterizes the pain as sharp. Pt reports her chest pain is constant but mild, aching pain. Had 2 episodes of sharp pains with exertion, carrying grocery bags 2 weeks ago. The sharp pain slowly subsided. Pt reports she will have chest pain if put on treadmill test. Had labs done 1.5 years ago, said her cholesterol was good. Pt has hx of palpitations during pregnancy.  Denies chest pain with touch. Regarding dyspnea/shortness of breath: This tends to be worse with exertion walking a short distance. pt verbalized since she's had Covid of this year 2022ROS: Discussed relevant lab and imaging studies along with the plan of treatment with the nurse. EYES:  Negative for blurred vision and eye pain. E/N/T:  Negative for epistaxis and hoarseness. CARDIOVASCULAR:  Please see HPI. RESPIRATORY:  Negative for cough and hemoptysis. GASTROINTESTINAL:  Negative for abdominal pain and dysphagia. MUSCULOSKELETAL:  Negative for arthralgias and back pain. NEUROLOGICAL:  Negative for headaches, paresthesias, and weakness. HEMATOLOGIC/LYMPHATIC:  Negative for easy bruising, bleeding, and lymphadenopathy. PSYCHIATRIC:  No symptoms reported. Past Medical History / Family History / Social History: Last Reviewed on 9/12/2022 09:09 AM by Leola White Medical History: Covid positive 2022 INFLUENZA VACCINE: was last done 2021 COVID-19 Vaccine: [id number] Surgical History: Surgical/Procedural History: Tubal LigationOther Arthroscopy: left knee Family History: Father: coronary artery disease; hypertension Mother:;  breast cancer;  hypothyroidism Social History: Social History: Occupation: Homemaker Marital Status:  Children: 6 Tobacco/Alcohol/Supplements: Last Reviewed on 9/12/2022 09:09 AM by Noel Coley/ALCOHOL/SUPPLEMENTS Tobacco: Current Smoker: She currently smokes every day, 1 pack per day. Alcohol: Non-drinker Caffeine:  She admits to consuming caffeine via coffee and soda ( 7 servings per day ). Substance Abuse History: Last Reviewed on 9/12/2022 09:09 AM by Ila Martinez Health History: Last Reviewed on 9/12/2022 09:09 AM by Adrienne ColeyCommunicable Diseases (eg STDs):  Last Reviewed on 9/12/2022 09:09 AM by Adrienne ColeyAllergies: Last Reviewed on 9/12/2022 09:09 AM by Wili ColeycaPenicillins:  azithromycin:  codeine:  Phenergan:  Current Medications: Last Reviewed on 9/12/2022 09:09 AM by Claudean Berry, NilsaeccaALBUTEROL HFA 90MCG (9GM) AER [INHALE 2 PUFFS BY MOUTH EVERY 6 HOURS AS NEEDED FOR COUGH]Objective:Exams: GENERAL:  Alert, oriented to person, place and time x3. EYES:  Normal lids without xanthelasma; conjunctiva unremarkable; no scleral icterus. HEENT:  Face symmetric, voice clear, extraocular muscles intact. NECK:  Supple. No bruits, No JVD. LUNGS:  Clear to auscultation. No rales, no wheezing. CARDIAC:  Regular rate and rhythm. PMI not displaced. ABDOMEN:  Soft. Positive bowel sounds. Non-tender. MUSCULOSKELETAL:  Normal range of motion, strength and tone. EXTREMITIES:  No edema. Good muscle tone and strength. SKIN: No significant rashes or lesions; no suspicious moles. NEUROLOGICAL:  No focal deficits, cranial nerves II-XII are grossly intact. PSYCHIATRIC:  Appropriate affect and demeanor; normal speech pattern; grossly normal memory. Assessment: R07.9   Chest pain, unspecified   R07.89   Other chest pain   R07.89   Other chest pain   R06.02   Shortness of breath   ORDERS: Procedures Ordered:   [id number]  Education and train for pt self-mgmt by qualified, nonphysician, ea 30 minutes; individual pt  (Send-Out)        XCATH  Cardiac Cath  (In-House)      Plan: Chest pain, unspecifiedMedication list has been reviewed. Continue current medications. Testing/Procedures: Cardiac Catheterization - Explained to the patient the indication, procedure, risks, and benefits of cardiac catheterization. The patient understands and wishes to proceed with the cath to be performed tomorrow at [facility] for evaluation of chest pain. Will obtain previous office note from hospital for review. Discussed with patient diet, exercise plan and lifestyle modifications. The above note was transcribed by Diya Justin and authenticated by Dr. Conrado Bettencourt prior to sign off.     Orders:   [id number]  Education and train for pt self-mgmt by qualified, nonphysician, mynor 30 minutes; individual pt  (Send-Out)        XCATH  Cardiac Cath  (In-House)        Patient Education Handouts:   COV Chest Pain    COV Heart Healthy Diet  Patient Recommendations: For  Chest pain, unspecified:Your medication list has been reviewed. Continue current medications. You need to have the following test/procedure(s) done: Cardiac Catheterization - Explained to you the indication, procedure, risks and benefits of cardiac catheterization.   You expressed a desire to go forward with the procedure to be performed tomorrow at [place] for evaluation of ^ chest pain

## 2022-09-13 NOTE — Clinical Note
TRANSFER - OUT REPORT:     Verbal report given to: Brian Briones, (at bedside). Report consisted of patient's Situation, Background, Assessment and   Recommendations(SBAR). Opportunity for questions and clarification was provided. Patient transported with a Registered Nurse. Patient transported to: recovery.

## 2022-09-19 ENCOUNTER — HOSPITAL ENCOUNTER (OUTPATIENT)
Dept: VASCULAR SURGERY | Age: 52
Discharge: HOME | End: 2022-09-19
Attending: INTERNAL MEDICINE
Payer: MEDICAID

## 2022-09-19 ENCOUNTER — TRANSCRIBE ORDER (OUTPATIENT)
Dept: SCHEDULING | Age: 52
End: 2022-09-19

## 2022-09-19 DIAGNOSIS — R10.30 GROIN PAIN: ICD-10-CM

## 2022-09-19 DIAGNOSIS — R10.30 GROIN PAIN: Primary | ICD-10-CM

## 2022-09-19 PROCEDURE — 93926 LOWER EXTREMITY STUDY: CPT

## 2022-09-20 LAB
LEFT ABI: 1.13
LEFT POSTERIOR TIBIAL: 129 MMHG
RIGHT ABI: 1.1
RIGHT ARM BP: 126 MMHG
RIGHT CFA PROX SYS PSV: 130.2 CM/S
RIGHT POSTERIOR TIBIAL: 138 MMHG
RIGHT SFA DIST VEL RATIO: 0.88
RIGHT SFA MID VEL RATIO: 1
RIGHT SFA PROX VEL RATIO: 0.9
RIGHT SUPER FEMORAL DIST SYS PSV: 92.9 CM/S
RIGHT SUPER FEMORAL MID SYS PSV: 105.7 CM/S
RIGHT SUPER FEMORAL PROX SYS PSV: 111.2 CM/S
VAS LEFT DORSALIS PEDIS BP: 142 MMHG
VAS RIGHT DORSALIS PEDIS BP: 112 MMHG

## 2025-07-21 NOTE — Clinical Note
Pt here for nulojix.  Arrives ambulatory.  Denies any new complaints.  Tx complete without incident.  Pt d/c'd in stable condition.  Returns 8/18/25 for nulojix.    Right femoral artery. Accessed successfully. Femoral access needle used.

## (undated) DEVICE — ADMINISTRATION SET 72 IN SINGLE LUER LCK NAMIC

## (undated) DEVICE — PINNACLE INTRODUCER SHEATH: Brand: PINNACLE

## (undated) DEVICE — NEEDLE ANGIO 18GA L9CM NRML 1 WALL SMOOTH FINISH CLR HUB FOR

## (undated) DEVICE — CATHETER KIT JL 4 JL5 6 FRX100 CM 110 CM 145 PGTL DXTERITY

## (undated) DEVICE — HEART CATH-SFMC: Brand: MEDLINE INDUSTRIES, INC.